# Patient Record
Sex: MALE | Race: WHITE | NOT HISPANIC OR LATINO
[De-identification: names, ages, dates, MRNs, and addresses within clinical notes are randomized per-mention and may not be internally consistent; named-entity substitution may affect disease eponyms.]

---

## 2021-06-09 PROBLEM — Z00.00 ENCOUNTER FOR PREVENTIVE HEALTH EXAMINATION: Status: ACTIVE | Noted: 2021-06-09

## 2021-06-15 ENCOUNTER — NON-APPOINTMENT (OUTPATIENT)
Age: 76
End: 2021-06-15

## 2021-06-15 ENCOUNTER — APPOINTMENT (OUTPATIENT)
Dept: CARDIOTHORACIC SURGERY | Facility: CLINIC | Age: 76
End: 2021-06-15
Payer: MEDICARE

## 2021-06-15 PROCEDURE — 99205 OFFICE O/P NEW HI 60 MIN: CPT

## 2021-06-16 ENCOUNTER — APPOINTMENT (OUTPATIENT)
Dept: CARDIOTHORACIC SURGERY | Facility: CLINIC | Age: 76
End: 2021-06-16
Payer: MEDICARE

## 2021-06-16 DIAGNOSIS — Z87.891 PERSONAL HISTORY OF NICOTINE DEPENDENCE: ICD-10-CM

## 2021-06-16 DIAGNOSIS — Z87.19 PERSONAL HISTORY OF OTHER DISEASES OF THE DIGESTIVE SYSTEM: ICD-10-CM

## 2021-06-16 DIAGNOSIS — I34.0 NONRHEUMATIC MITRAL (VALVE) INSUFFICIENCY: ICD-10-CM

## 2021-06-16 DIAGNOSIS — Z86.79 PERSONAL HISTORY OF OTHER DISEASES OF THE CIRCULATORY SYSTEM: ICD-10-CM

## 2021-06-16 PROCEDURE — 99215 OFFICE O/P EST HI 40 MIN: CPT | Mod: 95

## 2021-06-16 NOTE — DATA REVIEWED
[FreeTextEntry1] : 6/1/21 YURY: \par 1. LV size and function normal, EF >55%\par 2. normal RV size and function\par 3. no evidence of left atrial or left atrial appendage thrombus or mass\par 4. severe prolapse of the posterior mitral valve leaflet (P2 scallop primarily). Severe MR.\par 5. regurgitant fraction is calculated @ 63%. MR jet is eccentric and directed anterioly\par 6. ascending aorta is normal in size. Mild laminar atherosclerosis in visualized aorta.\par \par 5/20/21 Cardiac cath @ [facility]: normal coronary arteries\par \par

## 2021-06-16 NOTE — ASSESSMENT
[FreeTextEntry1] : 75 year old male with history of HLD, Parkinsons disease and mitral valve prolapse presents with severe MR with worsening SOB/YO. NYHA III.  Dr. Ty reviewed the cardiac cath images and echocardiogram images with the patient and his wife. Dr. Ty discussed the risks, benefits and alternatives to surgery and the various surgical approaches, minimally invasive versus sternotomy.  Risks include but not limited to death, heart attack, bleeding, stroke, kidney problems and infection.  Dr. Ty quoted a 1-2% operative mortality and complication risk. Dr. Ty feels the patient will benefit from and is a candidate for minimally invasive, mitral valve repair.  All questions were addressed and the patient agrees to proceed with surgery. \par \par Plan: \par arrange PST labs, EKG, pa/lat chest xray and carotid US @ [facility] office or [facility]\par SD 7/7/21\par \par \par

## 2021-06-16 NOTE — HISTORY OF PRESENT ILLNESS
[FreeTextEntry1] : 76 yo male with PMH of HLD, Parkinson's disease, BPH s/t laser reduction surgery, osteoarthritis s/p right TKR, ulcerative colitis, SBO s/p laparotomies,  small bowel resection presents and mitral valve prolapse with  severe mitral regurgitation. Patient followed by cardiologist, Dr. Warren Lee with serial echocardiograms. He reports worsening SOB/YO and decreased exercise tolerance over past few months. He denies c/o chest pain, sob at rest, fever, lower extremity, syncope or palpitations. 6/1/21 YURY revealed severe prolapse of mitral valve with severe regurgitation. 5/20/21 Cardiac cath at [facility] revealed normal coronary arteries. \par \par Patient seen today via [facility] for surgical consult with Dr. Ty. \par \par Pfizer vaccine, 2nd shot 2/13/21

## 2021-06-16 NOTE — CONSULT LETTER
[Dear  ___] : Dear  [unfilled], [Please see my note below.] : Please see my note below. [Sincerely,] : Sincerely, [FreeTextEntry2] : Warren Lee M.D. [FreeTextEntry1] : Please find attached our consultation on your patient, MEETA HAMM \par We take a multidisciplinary team approach to patient care and consider you, the referring physician, an extension of our team. We will maintain an open line of communication with you throughout your patient's treatment course.  \par It is our commitment to provide your patient with the highest quality of advanced therapeutic options. We thank you for allowing us to participate in the care of your patient.\par Please do not hesitate to contact our team with any questions or concerns at [phone].\par  [FreeTextEntry3] : Ant Ty MD, FRCS\par \par [facility] \par Department of Cardiothoracic  Surgery \par Director of Robotic Cardiac Surgery\par Professor of Cardiovascular & Thoracic Surgery\par [employer] \par \par [facility]\par

## 2021-06-16 NOTE — REASON FOR VISIT
[Follow-Up: _____] : a [unfilled] follow-up visit [Home] : at home, [unfilled] , at the time of the visit. [Medical Office: ([facility])___] : at the medical office located in  [Verbal consent obtained from patient] : the patient, [unfilled] [Spouse] : spouse

## 2021-06-16 NOTE — END OF VISIT
[Time Spent: ___ minutes] : I have spent [unfilled] minutes of time on the encounter. [FreeTextEntry3] : I, STEPHANIE SPRAGUE , am scribing for and in the presence of ROYAL HANSON the following sections: History of present illness, past Medical/family/surgical/family/social history, review of systems, vital signs, physical exam and disposition.\par I personally performed the services described in the documentation, reviewed the documentation recorded by the scribe in my presence and it accurately and completely records my words and actions.\par

## 2021-06-17 ENCOUNTER — NON-APPOINTMENT (OUTPATIENT)
Age: 76
End: 2021-06-17

## 2021-06-30 ENCOUNTER — LABORATORY RESULT (OUTPATIENT)
Age: 76
End: 2021-06-30

## 2021-07-01 ENCOUNTER — NON-APPOINTMENT (OUTPATIENT)
Age: 76
End: 2021-07-01

## 2021-07-01 NOTE — H&P ADULT - ASSESSMENT
75 year old male with history of HLD, Parkinsons disease and mitral valve prolapse presents with severe MR with worsening SOB/YO. NYHA III. Dr. Ty reviewed the cardiac cath images and echocardiogram images with the patient and his wife. Dr. Ty discussed the risks, benefits and alternatives to surgery and the various surgical approaches, minimally invasive versus sternotomy. Risks include but not limited to death, heart attack, bleeding, stroke, kidney problems and infection. Dr. Ty quoted a 1-2% operative mortality and complication risk. Dr. Ty feels the patient will benefit from and is a candidate for minimally invasive, mitral valve repair. All questions were addressed and the patient agrees to proceed with surgery.     Plan:   PST  SDA 7/7  Pt instructed to take sinemet the morning of surgery  Instructions provided re antibacterial showers and pt given 3 sponges  Send 2 type + screens in SDA unit  ***Pt instructed by his urologist to bring alfuzosin and Avodart to hospital(will be distributed by pharmacy)   75 year old male with history of HLD, Parkinsons disease and mitral valve prolapse presents with severe MR with worsening SOB/YO. NYHA III. Dr. Ty reviewed the cardiac cath images and echocardiogram images with the patient and his wife. Dr. Ty discussed the risks, benefits and alternatives to surgery and the various surgical approaches, minimally invasive versus sternotomy. Risks include but not limited to death, heart attack, bleeding, stroke, kidney problems and infection. Dr. Ty quoted a 1-2% operative mortality and complication risk. Dr. Ty feels the patient will benefit from and is a candidate for minimally invasive, mitral valve repair. All questions were addressed and the patient agrees to proceed with surgery.     Plan:   PST  SDA 7/7  Pt instructed to take sinemet the morning of surgery  Instructions provided re antibacterial showers and pt given 3 sponges  Send 2 type + screens in SDA unit  ***Pt instructed by his urologist to bring alfuzosin and Avodart to hospital(will be distributed by pharmacy)      Addendum:     Patient seen and examines in the holding area. No changes since telehealth visit. Currently feeling well, no chest pain or SOB.

## 2021-07-01 NOTE — H&P ADULT - HISTORY OF PRESENT ILLNESS
74 yo male with PMH of HLD, Parkinson's disease, BPH s/p prostatectomy,  osteoarthritis s/p right TKR, ulcerative colitis, SBO s/p laparotomies, small bowel resection presents and mitral valve prolapse with severe mitral regurgitation. Patient followed by cardiologist, Dr. Warren Lee with serial echocardiograms. He reports worsening SOB/YO and decreased exercise tolerance over past few months. He denies c/o chest pain, sob at rest, fever, lower extremity, syncope or palpitations. 6/1/21 YURY revealed severe prolapse of mitral valve with severe regurgitation. 5/20/21 Cardiac cath at [facility] revealed normal coronary arteries. NYHA III.    Patient seen today via [facility] for surgical consult with Dr. Ty. He presents today for elective surgery.    Pfizer vaccine, 2nd shot 2/13/21

## 2021-07-01 NOTE — H&P ADULT - NSHPPHYSICALEXAM_GEN_ALL_CORE
Constitutional: NAD    Eyes: EOMI    ENMT: PERRL    Neck: supple, no JVD    Respiratory: CTABL    Cardiovascular: RRR, no m/r/g    Gastrointestinal: + BS, soft, non tender    Extremities: warm, no edema    Vascular: Carotids, radials, femorals, DP, PT 2 + bilaterally    Neurological: A&O, non focal    Skin: no lesions or rashes    Musculoskeletal: normal gait, strength 5/5 and equal in bilateral upper and lower extremities

## 2021-07-01 NOTE — H&P ADULT - NSICDXPASTSURGICALHX_GEN_ALL_CORE_FT
PAST SURGICAL HISTORY:  H/O inguinal hernia repair     S/P small bowel resection     S/P total knee replacement, right

## 2021-07-01 NOTE — H&P ADULT - NSHPLABSRESULTS_GEN_ALL_CORE
Hgb A1C = 5.0  creat = 1.06  hct= 37.3  hgb= 11.9  plt= 252  WBC= 7.25  INR= 1.00  tot alb= 4.3  tot bili= 0.5    TSH= 2.24    6/30/21 Chest xray: clear lungs    3/23/21 EKG: SR w/frequent PVCs, 78bpm    6/1/21 YURY:   1. LV size and function normal, EF >55%  2. normal RV size and function  3. no evidence of left atrial or left atrial appendage thrombus or mass  4. severe prolapse of the posterior mitral valve leaflet (P2 scallop primarily). Severe MR.  5. regurgitant fraction is calculated @ 63%. MR jet is eccentric and directed anterioly  6. ascending aorta is normal in size. Mild laminar atherosclerosis in visualized aorta.    5/20/21 Cardiac cath @ [facility]: normal coronary arteries

## 2021-07-01 NOTE — H&P ADULT - NSHPREVIEWOFSYSTEMS_GEN_ALL_CORE
CONSTITUTIONAL: No fevers / chills, sweats, fatigue  NEUROLOGICAL: No parathesias, seizures, syncope, confusion  EYES: No blurry vision, discharge, pain, loss of vision  ENMT: No difficulty hearing, vertigo, dysphagia, epistaxis  CV: per HPI  RESPIRATORY:  No wheezing, SOB, cough / sputum, hemoptysis  GI: No nausea, vomiting, diarrhea, constipation, melena  : No hematuria, dysuria, urgency, incontinence  MUSCULOSKELETAL: No arthritis, joint swelling, muscle weakness  SKIN/BREAST: No rash, itching, hair loss, masses  PSYCHIATRY: No depression, anxiety

## 2021-07-01 NOTE — H&P ADULT - NSICDXFAMILYHX_GEN_ALL_CORE_FT
FAMILY HISTORY:  Father  Still living? Unknown  Known health problems: none, Age at diagnosis: Age Unknown    Mother  Still living? Unknown  Known health problems: none, Age at diagnosis: Age Unknown

## 2021-07-01 NOTE — H&P ADULT - NSICDXPASTMEDICALHX_GEN_ALL_CORE_FT
PAST MEDICAL HISTORY:  BPH associated with nocturia     GERD (gastroesophageal reflux disease)     History of mitral valve prolapse     Parkinson disease     SBO (small bowel obstruction)     Ulcerative colitis

## 2021-07-06 ENCOUNTER — TRANSCRIPTION ENCOUNTER (OUTPATIENT)
Age: 76
End: 2021-07-06

## 2021-07-06 VITALS
SYSTOLIC BLOOD PRESSURE: 137 MMHG | RESPIRATION RATE: 17 BRPM | DIASTOLIC BLOOD PRESSURE: 64 MMHG | OXYGEN SATURATION: 95 % | TEMPERATURE: 98 F | HEIGHT: 66 IN | WEIGHT: 141.98 LBS | HEART RATE: 74 BPM

## 2021-07-06 NOTE — PRE-OP CHECKLIST - SELECT TESTS ORDERED
TSH, HbA1C 5.0, YURY/CMP/PT/PTT/INR/EKG/CXR TSH, HbA1C 5.0, YURY/CBC/CMP/PT/PTT/INR/EKG/CXR TSH, HbA1C 5.0, YURY/CBC/CMP/PT/PTT/INR/Type and Screen/EKG/CXR

## 2021-07-07 ENCOUNTER — INPATIENT (INPATIENT)
Facility: HOSPITAL | Age: 76
LOS: 3 days | Discharge: HOME | DRG: 219 | End: 2021-07-11
Attending: THORACIC SURGERY (CARDIOTHORACIC VASCULAR SURGERY) | Admitting: THORACIC SURGERY (CARDIOTHORACIC VASCULAR SURGERY)
Payer: MEDICARE

## 2021-07-07 ENCOUNTER — APPOINTMENT (OUTPATIENT)
Dept: CARDIOTHORACIC SURGERY | Facility: HOSPITAL | Age: 76
End: 2021-07-07

## 2021-07-07 ENCOUNTER — RESULT REVIEW (OUTPATIENT)
Age: 76
End: 2021-07-07

## 2021-07-07 DIAGNOSIS — Z98.890 OTHER SPECIFIED POSTPROCEDURAL STATES: ICD-10-CM

## 2021-07-07 DIAGNOSIS — Z09 ENCOUNTER FOR FOLLOW-UP EXAMINATION AFTER COMPLETED TREATMENT FOR CONDITIONS OTHER THAN MALIGNANT NEOPLASM: ICD-10-CM

## 2021-07-07 DIAGNOSIS — Z96.651 PRESENCE OF RIGHT ARTIFICIAL KNEE JOINT: Chronic | ICD-10-CM

## 2021-07-07 DIAGNOSIS — Z90.49 ACQUIRED ABSENCE OF OTHER SPECIFIED PARTS OF DIGESTIVE TRACT: Chronic | ICD-10-CM

## 2021-07-07 DIAGNOSIS — Z98.890 OTHER SPECIFIED POSTPROCEDURAL STATES: Chronic | ICD-10-CM

## 2021-07-07 LAB
ALBUMIN SERPL ELPH-MCNC: 3.6 G/DL — SIGNIFICANT CHANGE UP (ref 3.3–5)
ALP SERPL-CCNC: 45 U/L — SIGNIFICANT CHANGE UP (ref 40–120)
ALT FLD-CCNC: 6 U/L — LOW (ref 10–45)
ANION GAP SERPL CALC-SCNC: 9 MMOL/L — SIGNIFICANT CHANGE UP (ref 5–17)
ANION GAP SERPL CALC-SCNC: 9 MMOL/L — SIGNIFICANT CHANGE UP (ref 5–17)
ANISOCYTOSIS BLD QL: SLIGHT — SIGNIFICANT CHANGE UP
APTT BLD: 31.8 SEC — SIGNIFICANT CHANGE UP (ref 27.5–35.5)
APTT BLD: 41.8 SEC — HIGH (ref 27.5–35.5)
AST SERPL-CCNC: 46 U/L — HIGH (ref 10–40)
BASOPHILS # BLD AUTO: 0 K/UL — SIGNIFICANT CHANGE UP (ref 0–0.2)
BASOPHILS NFR BLD AUTO: 0 % — SIGNIFICANT CHANGE UP (ref 0–2)
BILIRUB SERPL-MCNC: 1.1 MG/DL — SIGNIFICANT CHANGE UP (ref 0.2–1.2)
BLD GP AB SCN SERPL QL: NEGATIVE — SIGNIFICANT CHANGE UP
BLD GP AB SCN SERPL QL: NEGATIVE — SIGNIFICANT CHANGE UP
BUN SERPL-MCNC: 17 MG/DL — SIGNIFICANT CHANGE UP (ref 7–23)
BUN SERPL-MCNC: 18 MG/DL — SIGNIFICANT CHANGE UP (ref 7–23)
BURR CELLS BLD QL SMEAR: PRESENT — SIGNIFICANT CHANGE UP
CALCIUM SERPL-MCNC: 9.4 MG/DL — SIGNIFICANT CHANGE UP (ref 8.4–10.5)
CALCIUM SERPL-MCNC: 9.9 MG/DL — SIGNIFICANT CHANGE UP (ref 8.4–10.5)
CHLORIDE SERPL-SCNC: 110 MMOL/L — HIGH (ref 96–108)
CHLORIDE SERPL-SCNC: 110 MMOL/L — HIGH (ref 96–108)
CO2 SERPL-SCNC: 23 MMOL/L — SIGNIFICANT CHANGE UP (ref 22–31)
CO2 SERPL-SCNC: 23 MMOL/L — SIGNIFICANT CHANGE UP (ref 22–31)
CREAT SERPL-MCNC: 0.87 MG/DL — SIGNIFICANT CHANGE UP (ref 0.5–1.3)
CREAT SERPL-MCNC: 1.01 MG/DL — SIGNIFICANT CHANGE UP (ref 0.5–1.3)
ELLIPTOCYTES BLD QL SMEAR: SLIGHT — SIGNIFICANT CHANGE UP
EOSINOPHIL # BLD AUTO: 0 K/UL — SIGNIFICANT CHANGE UP (ref 0–0.5)
EOSINOPHIL NFR BLD AUTO: 0 % — SIGNIFICANT CHANGE UP (ref 0–6)
GAS PNL BLDA: SIGNIFICANT CHANGE UP
GLUCOSE BLDC GLUCOMTR-MCNC: 130 MG/DL — HIGH (ref 70–99)
GLUCOSE BLDC GLUCOMTR-MCNC: 171 MG/DL — HIGH (ref 70–99)
GLUCOSE BLDC GLUCOMTR-MCNC: 199 MG/DL — HIGH (ref 70–99)
GLUCOSE BLDC GLUCOMTR-MCNC: 204 MG/DL — HIGH (ref 70–99)
GLUCOSE SERPL-MCNC: 141 MG/DL — HIGH (ref 70–99)
GLUCOSE SERPL-MCNC: 174 MG/DL — HIGH (ref 70–99)
HCT VFR BLD CALC: 26.3 % — LOW (ref 39–50)
HCT VFR BLD CALC: 30 % — LOW (ref 39–50)
HGB BLD-MCNC: 10 G/DL — LOW (ref 13–17)
HGB BLD-MCNC: 8.4 G/DL — LOW (ref 13–17)
HYPOCHROMIA BLD QL: SLIGHT — SIGNIFICANT CHANGE UP
INR BLD: 1.2 — HIGH (ref 0.88–1.16)
INR BLD: 1.22 — HIGH (ref 0.88–1.16)
LYMPHOCYTES # BLD AUTO: 0.47 K/UL — LOW (ref 1–3.3)
LYMPHOCYTES # BLD AUTO: 2.6 % — LOW (ref 13–44)
MACROCYTES BLD QL: SLIGHT — SIGNIFICANT CHANGE UP
MAGNESIUM SERPL-MCNC: 2.3 MG/DL — SIGNIFICANT CHANGE UP (ref 1.6–2.6)
MANUAL SMEAR VERIFICATION: SIGNIFICANT CHANGE UP
MCHC RBC-ENTMCNC: 31.5 PG — SIGNIFICANT CHANGE UP (ref 27–34)
MCHC RBC-ENTMCNC: 31.9 GM/DL — LOW (ref 32–36)
MCHC RBC-ENTMCNC: 32.7 PG — SIGNIFICANT CHANGE UP (ref 27–34)
MCHC RBC-ENTMCNC: 33.3 GM/DL — SIGNIFICANT CHANGE UP (ref 32–36)
MCV RBC AUTO: 98 FL — SIGNIFICANT CHANGE UP (ref 80–100)
MCV RBC AUTO: 98.5 FL — SIGNIFICANT CHANGE UP (ref 80–100)
MONOCYTES # BLD AUTO: 0.79 K/UL — SIGNIFICANT CHANGE UP (ref 0–0.9)
MONOCYTES NFR BLD AUTO: 4.4 % — SIGNIFICANT CHANGE UP (ref 2–14)
NEUTROPHILS # BLD AUTO: 16.67 K/UL — HIGH (ref 1.8–7.4)
NEUTROPHILS NFR BLD AUTO: 93 % — HIGH (ref 43–77)
NRBC # BLD: 0 /100 WBCS — SIGNIFICANT CHANGE UP (ref 0–0)
OVALOCYTES BLD QL SMEAR: SLIGHT — SIGNIFICANT CHANGE UP
PHOSPHATE SERPL-MCNC: 1.3 MG/DL — LOW (ref 2.5–4.5)
PLAT MORPH BLD: ABNORMAL
PLATELET # BLD AUTO: 159 K/UL — SIGNIFICANT CHANGE UP (ref 150–400)
PLATELET # BLD AUTO: 184 K/UL — SIGNIFICANT CHANGE UP (ref 150–400)
POIKILOCYTOSIS BLD QL AUTO: SIGNIFICANT CHANGE UP
POTASSIUM SERPL-MCNC: 3.7 MMOL/L — SIGNIFICANT CHANGE UP (ref 3.5–5.3)
POTASSIUM SERPL-MCNC: 4.5 MMOL/L — SIGNIFICANT CHANGE UP (ref 3.5–5.3)
POTASSIUM SERPL-SCNC: 3.7 MMOL/L — SIGNIFICANT CHANGE UP (ref 3.5–5.3)
POTASSIUM SERPL-SCNC: 4.5 MMOL/L — SIGNIFICANT CHANGE UP (ref 3.5–5.3)
PROT SERPL-MCNC: 5.5 G/DL — LOW (ref 6–8.3)
PROTHROM AB SERPL-ACNC: 14.3 SEC — HIGH (ref 10.6–13.6)
PROTHROM AB SERPL-ACNC: 14.5 SEC — HIGH (ref 10.6–13.6)
RBC # BLD: 2.67 M/UL — LOW (ref 4.2–5.8)
RBC # BLD: 3.06 M/UL — LOW (ref 4.2–5.8)
RBC # FLD: 13.2 % — SIGNIFICANT CHANGE UP (ref 10.3–14.5)
RBC # FLD: 13.5 % — SIGNIFICANT CHANGE UP (ref 10.3–14.5)
RBC BLD AUTO: ABNORMAL
RH IG SCN BLD-IMP: POSITIVE — SIGNIFICANT CHANGE UP
RH IG SCN BLD-IMP: POSITIVE — SIGNIFICANT CHANGE UP
SCHISTOCYTES BLD QL AUTO: SIGNIFICANT CHANGE UP
SODIUM SERPL-SCNC: 142 MMOL/L — SIGNIFICANT CHANGE UP (ref 135–145)
SODIUM SERPL-SCNC: 142 MMOL/L — SIGNIFICANT CHANGE UP (ref 135–145)
WBC # BLD: 13.39 K/UL — HIGH (ref 3.8–10.5)
WBC # BLD: 17.93 K/UL — HIGH (ref 3.8–10.5)
WBC # FLD AUTO: 13.39 K/UL — HIGH (ref 3.8–10.5)
WBC # FLD AUTO: 17.93 K/UL — HIGH (ref 3.8–10.5)

## 2021-07-07 PROCEDURE — 93010 ELECTROCARDIOGRAM REPORT: CPT

## 2021-07-07 PROCEDURE — 99291 CRITICAL CARE FIRST HOUR: CPT

## 2021-07-07 PROCEDURE — 99292 CRITICAL CARE ADDL 30 MIN: CPT

## 2021-07-07 PROCEDURE — 76937 US GUIDE VASCULAR ACCESS: CPT | Mod: 26

## 2021-07-07 PROCEDURE — 71045 X-RAY EXAM CHEST 1 VIEW: CPT | Mod: 26

## 2021-07-07 PROCEDURE — 88305 TISSUE EXAM BY PATHOLOGIST: CPT | Mod: 26

## 2021-07-07 PROCEDURE — 33426 REPAIR OF MITRAL VALVE: CPT | Mod: AS

## 2021-07-07 PROCEDURE — 33426 REPAIR OF MITRAL VALVE: CPT

## 2021-07-07 RX ORDER — ALBUMIN HUMAN 25 %
250 VIAL (ML) INTRAVENOUS ONCE
Refills: 0 | Status: COMPLETED | OUTPATIENT
Start: 2021-07-07 | End: 2021-07-07

## 2021-07-07 RX ORDER — DEXMEDETOMIDINE HYDROCHLORIDE IN 0.9% SODIUM CHLORIDE 4 UG/ML
0.5 INJECTION INTRAVENOUS
Qty: 400 | Refills: 0 | Status: DISCONTINUED | OUTPATIENT
Start: 2021-07-07 | End: 2021-07-08

## 2021-07-07 RX ORDER — MULTIVIT-MIN/FERROUS GLUCONATE 9 MG/15 ML
1 LIQUID (ML) ORAL
Qty: 0 | Refills: 0 | DISCHARGE

## 2021-07-07 RX ORDER — NICARDIPINE HYDROCHLORIDE 30 MG/1
5 CAPSULE, EXTENDED RELEASE ORAL
Qty: 40 | Refills: 0 | Status: DISCONTINUED | OUTPATIENT
Start: 2021-07-07 | End: 2021-07-08

## 2021-07-07 RX ORDER — CARBIDOPA AND LEVODOPA 25; 100 MG/1; MG/1
1 TABLET ORAL ONCE
Refills: 0 | Status: COMPLETED | OUTPATIENT
Start: 2021-07-07 | End: 2021-07-07

## 2021-07-07 RX ORDER — PANTOPRAZOLE SODIUM 20 MG/1
40 TABLET, DELAYED RELEASE ORAL DAILY
Refills: 0 | Status: DISCONTINUED | OUTPATIENT
Start: 2021-07-07 | End: 2021-07-08

## 2021-07-07 RX ORDER — INSULIN HUMAN 100 [IU]/ML
2 INJECTION, SOLUTION SUBCUTANEOUS
Qty: 50 | Refills: 0 | Status: DISCONTINUED | OUTPATIENT
Start: 2021-07-07 | End: 2021-07-08

## 2021-07-07 RX ORDER — CHLORHEXIDINE GLUCONATE 213 G/1000ML
1 SOLUTION TOPICAL
Refills: 0 | Status: DISCONTINUED | OUTPATIENT
Start: 2021-07-07 | End: 2021-07-09

## 2021-07-07 RX ORDER — ESCITALOPRAM OXALATE 10 MG/1
1 TABLET, FILM COATED ORAL
Qty: 0 | Refills: 0 | DISCHARGE

## 2021-07-07 RX ORDER — DEXTROSE 50 % IN WATER 50 %
50 SYRINGE (ML) INTRAVENOUS
Refills: 0 | Status: DISCONTINUED | OUTPATIENT
Start: 2021-07-07 | End: 2021-07-08

## 2021-07-07 RX ORDER — DEXTROSE 50 % IN WATER 50 %
25 SYRINGE (ML) INTRAVENOUS
Refills: 0 | Status: DISCONTINUED | OUTPATIENT
Start: 2021-07-07 | End: 2021-07-08

## 2021-07-07 RX ORDER — SODIUM CHLORIDE 9 MG/ML
500 INJECTION, SOLUTION INTRAVENOUS ONCE
Refills: 0 | Status: COMPLETED | OUTPATIENT
Start: 2021-07-07 | End: 2021-07-07

## 2021-07-07 RX ORDER — ALBUMIN HUMAN 25 %
250 VIAL (ML) INTRAVENOUS
Refills: 0 | Status: COMPLETED | OUTPATIENT
Start: 2021-07-07 | End: 2021-07-07

## 2021-07-07 RX ORDER — MEPERIDINE HYDROCHLORIDE 50 MG/ML
25 INJECTION INTRAMUSCULAR; INTRAVENOUS; SUBCUTANEOUS ONCE
Refills: 0 | Status: DISCONTINUED | OUTPATIENT
Start: 2021-07-07 | End: 2021-07-07

## 2021-07-07 RX ORDER — HYDROMORPHONE HYDROCHLORIDE 2 MG/ML
0.5 INJECTION INTRAMUSCULAR; INTRAVENOUS; SUBCUTANEOUS ONCE
Refills: 0 | Status: DISCONTINUED | OUTPATIENT
Start: 2021-07-07 | End: 2021-07-08

## 2021-07-07 RX ORDER — CHLORHEXIDINE GLUCONATE 213 G/1000ML
15 SOLUTION TOPICAL EVERY 12 HOURS
Refills: 0 | Status: DISCONTINUED | OUTPATIENT
Start: 2021-07-07 | End: 2021-07-08

## 2021-07-07 RX ORDER — POTASSIUM PHOSPHATE, MONOBASIC POTASSIUM PHOSPHATE, DIBASIC 236; 224 MG/ML; MG/ML
30 INJECTION, SOLUTION INTRAVENOUS ONCE
Refills: 0 | Status: COMPLETED | OUTPATIENT
Start: 2021-07-07 | End: 2021-07-07

## 2021-07-07 RX ORDER — CARBIDOPA AND LEVODOPA 25; 100 MG/1; MG/1
1 TABLET ORAL
Qty: 0 | Refills: 0 | DISCHARGE

## 2021-07-07 RX ORDER — LANOLIN ALCOHOL/MO/W.PET/CERES
1 CREAM (GRAM) TOPICAL
Qty: 0 | Refills: 0 | DISCHARGE

## 2021-07-07 RX ORDER — NOREPINEPHRINE BITARTRATE/D5W 8 MG/250ML
0.05 PLASTIC BAG, INJECTION (ML) INTRAVENOUS
Qty: 8 | Refills: 0 | Status: DISCONTINUED | OUTPATIENT
Start: 2021-07-07 | End: 2021-07-09

## 2021-07-07 RX ORDER — DUTASTERIDE 0.5 MG/1
1 CAPSULE, LIQUID FILLED ORAL
Qty: 0 | Refills: 0 | DISCHARGE

## 2021-07-07 RX ORDER — ATORVASTATIN CALCIUM 80 MG/1
1 TABLET, FILM COATED ORAL
Qty: 0 | Refills: 0 | DISCHARGE

## 2021-07-07 RX ORDER — PREGABALIN 225 MG/1
1 CAPSULE ORAL
Qty: 0 | Refills: 0 | DISCHARGE

## 2021-07-07 RX ORDER — ACETAMINOPHEN 500 MG
1000 TABLET ORAL ONCE
Refills: 0 | Status: COMPLETED | OUTPATIENT
Start: 2021-07-07 | End: 2021-07-07

## 2021-07-07 RX ORDER — ALFUZOSIN HYDROCHLORIDE 10 MG/1
1 TABLET, EXTENDED RELEASE ORAL
Qty: 0 | Refills: 0 | DISCHARGE

## 2021-07-07 RX ORDER — ASPIRIN/CALCIUM CARB/MAGNESIUM 324 MG
81 TABLET ORAL DAILY
Refills: 0 | Status: DISCONTINUED | OUTPATIENT
Start: 2021-07-07 | End: 2021-07-08

## 2021-07-07 RX ORDER — HYOSCYAMINE SULFATE 0.13 MG
1 TABLET ORAL
Qty: 0 | Refills: 0 | DISCHARGE

## 2021-07-07 RX ORDER — ZOLPIDEM TARTRATE 10 MG/1
1 TABLET ORAL
Qty: 0 | Refills: 0 | DISCHARGE

## 2021-07-07 RX ORDER — HEPARIN SODIUM 5000 [USP'U]/ML
5000 INJECTION INTRAVENOUS; SUBCUTANEOUS EVERY 8 HOURS
Refills: 0 | Status: DISCONTINUED | OUTPATIENT
Start: 2021-07-07 | End: 2021-07-11

## 2021-07-07 RX ORDER — CARBIDOPA AND LEVODOPA 25; 100 MG/1; MG/1
1 TABLET ORAL THREE TIMES A DAY
Refills: 0 | Status: DISCONTINUED | OUTPATIENT
Start: 2021-07-08 | End: 2021-07-08

## 2021-07-07 RX ORDER — BUPIVACAINE 13.3 MG/ML
20 INJECTION, SUSPENSION, LIPOSOMAL INFILTRATION ONCE
Refills: 0 | Status: DISCONTINUED | OUTPATIENT
Start: 2021-07-07 | End: 2021-07-08

## 2021-07-07 RX ORDER — VANCOMYCIN HCL 1 G
1000 VIAL (EA) INTRAVENOUS ONCE
Refills: 0 | Status: COMPLETED | OUTPATIENT
Start: 2021-07-07 | End: 2021-07-07

## 2021-07-07 RX ORDER — FENTANYL CITRATE 50 UG/ML
25 INJECTION INTRAVENOUS ONCE
Refills: 0 | Status: DISCONTINUED | OUTPATIENT
Start: 2021-07-07 | End: 2021-07-07

## 2021-07-07 RX ORDER — GABAPENTIN 400 MG/1
1 CAPSULE ORAL
Qty: 0 | Refills: 0 | DISCHARGE

## 2021-07-07 RX ORDER — MILRINONE LACTATE 1 MG/ML
0.12 INJECTION, SOLUTION INTRAVENOUS
Qty: 20 | Refills: 0 | Status: DISCONTINUED | OUTPATIENT
Start: 2021-07-07 | End: 2021-07-09

## 2021-07-07 RX ORDER — SODIUM CHLORIDE 9 MG/ML
1000 INJECTION INTRAMUSCULAR; INTRAVENOUS; SUBCUTANEOUS
Refills: 0 | Status: DISCONTINUED | OUTPATIENT
Start: 2021-07-07 | End: 2021-07-09

## 2021-07-07 RX ORDER — FAMOTIDINE 10 MG/ML
1 INJECTION INTRAVENOUS
Qty: 0 | Refills: 0 | DISCHARGE

## 2021-07-07 RX ADMIN — Medication 125 MILLILITER(S): at 19:43

## 2021-07-07 RX ADMIN — SODIUM CHLORIDE 10 MILLILITER(S): 9 INJECTION INTRAMUSCULAR; INTRAVENOUS; SUBCUTANEOUS at 16:15

## 2021-07-07 RX ADMIN — MILRINONE LACTATE 4.83 MICROGRAM(S)/KG/MIN: 1 INJECTION, SOLUTION INTRAVENOUS at 19:46

## 2021-07-07 RX ADMIN — Medication 125 MILLILITER(S): at 22:21

## 2021-07-07 RX ADMIN — Medication 250 MILLIGRAM(S): at 23:14

## 2021-07-07 RX ADMIN — NICARDIPINE HYDROCHLORIDE 25 MG/HR: 30 CAPSULE, EXTENDED RELEASE ORAL at 19:48

## 2021-07-07 RX ADMIN — DEXMEDETOMIDINE HYDROCHLORIDE IN 0.9% SODIUM CHLORIDE 8.05 MICROGRAM(S)/KG/HR: 4 INJECTION INTRAVENOUS at 19:46

## 2021-07-07 RX ADMIN — CHLORHEXIDINE GLUCONATE 15 MILLILITER(S): 213 SOLUTION TOPICAL at 19:42

## 2021-07-07 RX ADMIN — Medication 125 MILLILITER(S): at 19:44

## 2021-07-07 RX ADMIN — Medication 6.04 MICROGRAM(S)/KG/MIN: at 19:47

## 2021-07-07 RX ADMIN — SODIUM CHLORIDE 500 MILLILITER(S): 9 INJECTION, SOLUTION INTRAVENOUS at 19:48

## 2021-07-07 RX ADMIN — POTASSIUM PHOSPHATE, MONOBASIC POTASSIUM PHOSPHATE, DIBASIC 83.33 MILLIMOLE(S): 236; 224 INJECTION, SOLUTION INTRAVENOUS at 23:43

## 2021-07-07 RX ADMIN — FENTANYL CITRATE 25 MICROGRAM(S): 50 INJECTION INTRAVENOUS at 23:15

## 2021-07-07 RX ADMIN — Medication 400 MILLIGRAM(S): at 20:35

## 2021-07-07 RX ADMIN — FENTANYL CITRATE 25 MICROGRAM(S): 50 INJECTION INTRAVENOUS at 22:52

## 2021-07-07 RX ADMIN — Medication 1000 MILLIGRAM(S): at 20:45

## 2021-07-07 RX ADMIN — Medication 125 MILLILITER(S): at 22:36

## 2021-07-07 RX ADMIN — CARBIDOPA AND LEVODOPA 1 TABLET(S): 25; 100 TABLET ORAL at 21:30

## 2021-07-07 RX ADMIN — INSULIN HUMAN 2 UNIT(S)/HR: 100 INJECTION, SOLUTION SUBCUTANEOUS at 18:59

## 2021-07-07 NOTE — BRIEF OPERATIVE NOTE - NSICDXBRIEFPROCEDURE_GEN_ALL_CORE_FT
PROCEDURES:  Minimally invasive repair or replacement of mitral valve with transesophageal echocardiography (YURY) 07-Jul-2021 11:10:20 Mitral valve repair Jodie Stephenson  
Hpi Title: Evaluation of a Skin Lesion
How Severe Are Your Spot(S)?: mild
Have Your Spot(S) Been Treated In The Past?: has not been treated

## 2021-07-07 NOTE — BRIEF OPERATIVE NOTE - COMMENTS
Arrived to PACU intubated, HD stable    I first assisted for the entirety of the case, including but not limited to opening, cannulation, valve repair/replacement, decannulation, and closure. Arrived to PACU intubated, HD stable no pressors    I first assisted for the entirety of the case, including but not limited to opening, cannulation, valve repair/replacement, decannulation, and closure.

## 2021-07-07 NOTE — PROGRESS NOTE ADULT - SUBJECTIVE AND OBJECTIVE BOX
CTICU  CRITICAL  CARE  attending     Hand off received 					   Pertinent clinical, laboratory, radiographic, hemodynamic, echocardiographic, respiratory data, microbiologic data and chart were reviewed and analyzed frequently throughout the course of the day and night  Patient seen and examined with CTS/ SH attending at bedside  Pt is a 75y , Male, HEALTH ISSUES - PROBLEM Dx:      , FAMILY HISTORY:  Known health problems: none (Father, Mother)    PAST MEDICAL & SURGICAL HISTORY:  GERD (gastroesophageal reflux disease)    History of mitral valve prolapse    Parkinson disease    Ulcerative colitis    BPH associated with nocturia    SBO (small bowel obstruction)    H/O inguinal hernia repair    S/P small bowel resection    S/P total knee replacement, right      Patient is a 75y old  Male who presents with a chief complaint of Mitral Valve Regurgitation (01 Jul 2021 13:39)      14 system review limited by mentation and multiorgan morbidity     Vital signs, hemodynamic and respiratory parameters were reviewed from the bedside nursing flowsheet.  ICU Vital Signs Last 24 Hrs  T(C): 36 (07 Jul 2021 16:20), Max: 36 (07 Jul 2021 16:20)  T(F): 96.8 (07 Jul 2021 16:20), Max: 96.8 (07 Jul 2021 16:20)  HR: 68 (07 Jul 2021 16:20) (68 - 68)  BP: --  BP(mean): --  ABP: 141/72 (07 Jul 2021 16:20) (141/72 - 141/72)  ABP(mean): 101 (07 Jul 2021 16:20) (101 - 101)  RR: 12 (07 Jul 2021 16:20) (12 - 12)  SpO2: 100% (07 Jul 2021 16:20) (100% - 100%)    Adult Advanced Hemodynamics Last 24 Hrs  CVP(mm Hg): --  CVP(cm H2O): --  CO: --  CI: --  PA: --  PA(mean): --  PCWP: --  SVR: --  SVRI: --  PVR: --  PVRI: --, ABG - ( 07 Jul 2021 16:23 )  pH, Arterial: 7.41  pH, Blood: x     /  pCO2: 34    /  pO2: 279   / HCO3: 22    / Base Excess: -2.4  /  SaO2: 100.0               Intake and output was reviewed and the fluid balance was calculated  Daily     Daily   I&O's Summary    07 Jul 2021 07:01  -  07 Jul 2021 16:31  --------------------------------------------------------  IN: 0 mL / OUT: 125 mL / NET: -125 mL        All lines and drain sites were assessed  Glycemic trend was reviewedCAPILLARY BLOOD GLUCOSE        No acute change in focality  Auscultation of the chest reveals equal bs  Abdomen is soft  Extremities are warm and well perfused  Wounds appear clean and unremarkable  Antibiotics are periop    labs            The current medications were reviewed   MEDICATIONS  (STANDING):  aspirin  chewable 81 milliGRAM(s) Oral daily  BUpivacaine liposome 1.3% Injectable (no eMAR) 20 milliLiter(s) Local Injection once  chlorhexidine 0.12% Liquid 15 milliLiter(s) Oral Mucosa every 12 hours  dextrose 50% Injectable 50 milliLiter(s) IV Push every 15 minutes  dextrose 50% Injectable 25 milliLiter(s) IV Push every 15 minutes  heparin   Injectable 5000 Unit(s) SubCutaneous every 8 hours  insulin regular Infusion 2 Unit(s)/Hr (2 mL/Hr) IV Continuous <Continuous>  pantoprazole  Injectable 40 milliGRAM(s) IV Push daily  sodium chloride 0.9%. 1000 milliLiter(s) (10 mL/Hr) IV Continuous <Continuous>  vancomycin  IVPB 1000 milliGRAM(s) IV Intermittent once    MEDICATIONS  (PRN):       PROBLEM LIST/ ASSESSMENT:  HEALTH ISSUES - PROBLEM Dx:      ,   Patient is a 75y old  Male who presents with a chief complaint of Mitral Valve Regurgitation (01 Jul 2021 13:39)     s/p cardiac surgery                My plan includes :  close hemodynamic, ventilatory and drain monitoring and management per post op routine    Monitor for arrhythmias and monitor parameters for organ perfusion  beta blockade not administered due to hemodynamic instability and bradycardia  monitor neurologic status  Head of the bed should remain elevated to 45 deg .   chest PT and IS will be encouraged  monitor adequacy of oxygenation and ventilation and attempt to wean oxygen  antibiotic regimen will be tailored to the clinical, laboratory and microbiologic data  Nutritional goals will be met using po eventually , ensure adequate caloric intake and montior the same  Stress ulcer and VTE prophylaxis will be achieved    Glycemic control is satisfactory  Electrolytes have been repleted as necessary and wound care has been carried out. Pain control has been achieved.   agressive physical therapy and early mobility and ambulation goals will be met   The family was updated about the course and plan  CRITICAL CARE TIME personally provided by me  in evaluation and management, reassessments, review and interpretation of labs and x-rays, ventilator and hemodynamic management, formulating a plan and coordinating care: ___90____ MIN.  Time does not include procedural time. Time spent was non routine post-operarive caRE and included multiple and repeated evaluations at the bedside  CTICU ATTENDING     					    Oscar Buckley MD

## 2021-07-07 NOTE — PROGRESS NOTE ADULT - SUBJECTIVE AND OBJECTIVE BOX
CTICU  CRITICAL  CARE  attending     Hand off received 					   Pertinent clinical, laboratory, radiographic, hemodynamic, echocardiographic, respiratory data, microbiologic data and chart were reviewed and analyzed frequently throughout the course of the day and night    76 yo male with HLD, Parkinson's disease, BPH s/p prostatectomy,  osteoarthritis s/p right TKR, ulcerative colitis, Small Bowel Obstruction with multiple laparotomies and small bowel resections.  He has long standing history of mitral valve prolapse with severe mitral regurgitation.   The patient has been followed by cardiologist, Dr. Warren Lee with serial echocardiograms.   He reports worsening SOB/YO and decreased exercise tolerance over past few months. He denies c/o chest pain, sob at rest, fever, lower extremity, syncope or palpitations.   YURY revealed severe prolapse of mitral valve with severe regurgitation.   Elective Cardiac cath at [facility]  revealed normal coronary arteries.    S/P Mitral valve repair.      FAMILY HISTORY:  Known health problems: none (Father, Mother)    PAST MEDICAL & SURGICAL HISTORY:  GERD (gastroesophageal reflux disease)  History of mitral valve prolapse  Parkinson disease  Ulcerative colitis  BPH associated with nocturia  SBO (small bowel obstruction)  H/O inguinal hernia repair  S/P small bowel resection  S/P total knee replacement, right          14 system review was unremarkable      Vital signs, hemodynamic and respiratory parameters were reviewed from the bedside nursing flow sheet.  ICU Vital Signs Last 24 Hrs  T(C): 36.4 (07 Jul 2021 22:32), Max: 36.4 (07 Jul 2021 22:32)  T(F): 97.5 (07 Jul 2021 22:32), Max: 97.5 (07 Jul 2021 22:32)  HR: 79 (07 Jul 2021 22:00) (66 - 95)  BP: 94/49 (07 Jul 2021 22:00) (94/49 - 133/58)  BP(mean): 70 (07 Jul 2021 22:00) (70 - 84)  ABP: 96/51 (07 Jul 2021 22:00) (93/49 - 147/72)  ABP(mean): 66 (07 Jul 2021 22:00) (64 - 102)  RR: 16 (07 Jul 2021 22:00) (12 - 16)  SpO2: 99% (07 Jul 2021 22:00) (96% - 100%)    Adult Advanced Hemodynamics Last 24 Hrs  CVP(mm Hg): 7 (07 Jul 2021 22:00) (7 - 16)  CVP(cm H2O): --  CO: --  CI: --  PA: --  PA(mean): --  PCWP: --  SVR: --  SVRI: --  PVR: --  PVRI: --, ABG - ( 07 Jul 2021 22:10 )  pH, Arterial: 7.38  pH, Blood: x     /  pCO2: 32    /  pO2: 131   / HCO3: 19    / Base Excess: -5.2  /  SaO2: 99.0              Mode: AC/ CMV (Assist Control/ Continuous Mandatory Ventilation)  RR (machine): 12  TV (machine): 500  FiO2: 50  PEEP: 5  ITime: 1  MAP: 7  PIP: 15    Intake and output was reviewed and the fluid balance was calculated  Daily     Daily   I&O's Summary    07 Jul 2021 07:01  -  07 Jul 2021 23:09  --------------------------------------------------------  IN: 1438.1 mL / OUT: 1540 mL / NET: -101.9 mL        All lines and drain sites were assessed      Neuro: Follows commands. Moves all 4 extremities.  Neck: No JVD.  CVS: S1, S2, No S3.  Lungs: Diminished breath sounds on the right side.  Abd: Soft. No tenderness. Hypoactive Bowel sounds.  Vascular: + DP/PT.  Extremities: No edema.  Lymphatic: Normal.  Skin: No abnormalities.      labs  CBC Full  -  ( 07 Jul 2021 22:14 )  WBC Count : 13.39 K/uL  RBC Count : 2.67 M/uL  Hemoglobin : 8.4 g/dL  Hematocrit : 26.3 %  Platelet Count - Automated : 159 K/uL  Mean Cell Volume : 98.5 fl  Mean Cell Hemoglobin : 31.5 pg  Mean Cell Hemoglobin Concentration : 31.9 gm/dL  Auto Neutrophil # : x  Auto Lymphocyte # : x  Auto Monocyte # : x  Auto Eosinophil # : x  Auto Basophil # : x  Auto Neutrophil % : x  Auto Lymphocyte % : x  Auto Monocyte % : x  Auto Eosinophil % : x  Auto Basophil % : x    07-07    142  |  110<H>  |  18  ----------------------------<  141<H>  3.7   |  23  |  1.01    Ca    9.4      07 Jul 2021 22:14  Phos  1.3     07-07  Mg     2.3     07-07    TPro  5.5<L>  /  Alb  3.6  /  TBili  1.1  /  DBili  x   /  AST  46<H>  /  ALT  6<L>  /  AlkPhos  45  07-07    PT/INR - ( 07 Jul 2021 22:14 )   PT: 14.3 sec;   INR: 1.20          PTT - ( 07 Jul 2021 22:14 )  PTT:41.8 sec  The current medications were reviewed   MEDICATIONS  (STANDING):  aspirin  chewable 81 milliGRAM(s) Enteral Tube daily  BUpivacaine liposome 1.3% Injectable (no eMAR) 20 milliLiter(s) Local Injection once  chlorhexidine 0.12% Liquid 15 milliLiter(s) Oral Mucosa every 12 hours  chlorhexidine 2% Cloths 1 Application(s) Topical <User Schedule>  dexMEDEtomidine Infusion 0.5 MICROgram(s)/kG/Hr (8.05 mL/Hr) IV Continuous <Continuous>  dextrose 50% Injectable 50 milliLiter(s) IV Push every 15 minutes  dextrose 50% Injectable 25 milliLiter(s) IV Push every 15 minutes  heparin   Injectable 5000 Unit(s) SubCutaneous every 8 hours  HYDROmorphone  Injectable 0.5 milliGRAM(s) IV Push once  insulin regular Infusion 2 Unit(s)/Hr (2 mL/Hr) IV Continuous <Continuous>  milrinone Infusion 0.25 MICROgram(s)/kG/Min (4.83 mL/Hr) IV Continuous <Continuous>  niCARdipine Infusion 5 mG/Hr (25 mL/Hr) IV Continuous <Continuous>  norepinephrine Infusion 0.05 MICROgram(s)/kG/Min (6.04 mL/Hr) IV Continuous <Continuous>  pantoprazole  Injectable 40 milliGRAM(s) IV Push daily  potassium phosphate IVPB 30 milliMole(s) IV Intermittent once  sodium chloride 0.9%. 1000 milliLiter(s) (10 mL/Hr) IV Continuous <Continuous>  vancomycin  IVPB 1000 milliGRAM(s) IV Intermittent once            75y old  Male with severe Mitral Valve Regurgitation.  S/P Mitral valve repair.  Hypophosphatemia.  Hemodynamically stable.  Good oxygenation.  Fair urine out put.        My plan includes :  WEAN to Extubate.  D/C norepinephrine.  Wean off milrinone.  Resume Sinemet for Parkinsonism.   Statin and Betablocker.  Close hemodynamic, ventilatory and drain monitoring and management  Monitor for arrhythmias and monitor parameters for organ perfusion  Monitor neurologic status  Monitor renal function.  Head of the bed should remain elevated to 45 deg .   Chest PT and IS will be encouraged  Monitor adequacy of oxygenation and ventilation and attempt to wean oxygen  Nutritional goals will be met using po eventually , ensure adequate caloric intake and monitor the same  Stress ulcer and VTE prophylaxis will be achieved    Glycemic control is satisfactory  Electrolytes have been repleted as necessary and wound care has been carried out. Pain control has been achieved.   Aggressive physical therapy and early mobility and ambulation goals will be met   The family was updated about the course and plan  CRITICAL CARE TIME SPENT in evaluation and management, reassessments, review and interpretation of labs and x-rays, ventilator and hemodynamic management, formulating a plan and coordinating care: ___60____ MIN.  Time does not include procedural time.  CTICU ATTENDING     					    Oseas Logan MD

## 2021-07-08 LAB
ALBUMIN SERPL ELPH-MCNC: 3.7 G/DL — SIGNIFICANT CHANGE UP (ref 3.3–5)
ALP SERPL-CCNC: 30 U/L — LOW (ref 40–120)
ALT FLD-CCNC: 8 U/L — LOW (ref 10–45)
ANION GAP SERPL CALC-SCNC: 10 MMOL/L — SIGNIFICANT CHANGE UP (ref 5–17)
ANION GAP SERPL CALC-SCNC: 8 MMOL/L — SIGNIFICANT CHANGE UP (ref 5–17)
APTT BLD: 30.8 SEC — SIGNIFICANT CHANGE UP (ref 27.5–35.5)
AST SERPL-CCNC: 44 U/L — HIGH (ref 10–40)
BASE EXCESS BLDA CALC-SCNC: -2.6 MMOL/L — LOW (ref -2–3)
BASE EXCESS BLDV CALC-SCNC: -3.7 MMOL/L — LOW (ref -2–3)
BILIRUB SERPL-MCNC: 0.7 MG/DL — SIGNIFICANT CHANGE UP (ref 0.2–1.2)
BUN SERPL-MCNC: 19 MG/DL — SIGNIFICANT CHANGE UP (ref 7–23)
BUN SERPL-MCNC: 21 MG/DL — SIGNIFICANT CHANGE UP (ref 7–23)
CALCIUM SERPL-MCNC: 8.9 MG/DL — SIGNIFICANT CHANGE UP (ref 8.4–10.5)
CALCIUM SERPL-MCNC: 9.1 MG/DL — SIGNIFICANT CHANGE UP (ref 8.4–10.5)
CHLORIDE SERPL-SCNC: 106 MMOL/L — SIGNIFICANT CHANGE UP (ref 96–108)
CHLORIDE SERPL-SCNC: 110 MMOL/L — HIGH (ref 96–108)
CO2 BLDA-SCNC: 23 MMOL/L — SIGNIFICANT CHANGE UP (ref 19–24)
CO2 BLDV-SCNC: 23.4 MMOL/L — SIGNIFICANT CHANGE UP (ref 22–26)
CO2 SERPL-SCNC: 22 MMOL/L — SIGNIFICANT CHANGE UP (ref 22–31)
CO2 SERPL-SCNC: 25 MMOL/L — SIGNIFICANT CHANGE UP (ref 22–31)
CREAT SERPL-MCNC: 0.82 MG/DL — SIGNIFICANT CHANGE UP (ref 0.5–1.3)
CREAT SERPL-MCNC: 0.87 MG/DL — SIGNIFICANT CHANGE UP (ref 0.5–1.3)
GAS PNL BLDA: SIGNIFICANT CHANGE UP
GAS PNL BLDA: SIGNIFICANT CHANGE UP
GAS PNL BLDV: SIGNIFICANT CHANGE UP
GLUCOSE BLDC GLUCOMTR-MCNC: 101 MG/DL — HIGH (ref 70–99)
GLUCOSE BLDC GLUCOMTR-MCNC: 109 MG/DL — HIGH (ref 70–99)
GLUCOSE BLDC GLUCOMTR-MCNC: 113 MG/DL — HIGH (ref 70–99)
GLUCOSE BLDC GLUCOMTR-MCNC: 117 MG/DL — HIGH (ref 70–99)
GLUCOSE BLDC GLUCOMTR-MCNC: 119 MG/DL — HIGH (ref 70–99)
GLUCOSE BLDC GLUCOMTR-MCNC: 121 MG/DL — HIGH (ref 70–99)
GLUCOSE BLDC GLUCOMTR-MCNC: 123 MG/DL — HIGH (ref 70–99)
GLUCOSE BLDC GLUCOMTR-MCNC: 126 MG/DL — HIGH (ref 70–99)
GLUCOSE BLDC GLUCOMTR-MCNC: 130 MG/DL — HIGH (ref 70–99)
GLUCOSE BLDC GLUCOMTR-MCNC: 138 MG/DL — HIGH (ref 70–99)
GLUCOSE BLDC GLUCOMTR-MCNC: 160 MG/DL — HIGH (ref 70–99)
GLUCOSE BLDC GLUCOMTR-MCNC: 64 MG/DL — LOW (ref 70–99)
GLUCOSE BLDC GLUCOMTR-MCNC: 79 MG/DL — SIGNIFICANT CHANGE UP (ref 70–99)
GLUCOSE SERPL-MCNC: 153 MG/DL — HIGH (ref 70–99)
GLUCOSE SERPL-MCNC: 87 MG/DL — SIGNIFICANT CHANGE UP (ref 70–99)
HCO3 BLDA-SCNC: 22 MMOL/L — SIGNIFICANT CHANGE UP (ref 21–28)
HCO3 BLDV-SCNC: 22 MMOL/L — SIGNIFICANT CHANGE UP (ref 22–29)
HCT VFR BLD CALC: 21.7 % — LOW (ref 39–50)
HCT VFR BLD CALC: 23 % — LOW (ref 39–50)
HCT VFR BLD CALC: 28.1 % — LOW (ref 39–50)
HGB BLD-MCNC: 7.1 G/DL — LOW (ref 13–17)
HGB BLD-MCNC: 7.5 G/DL — LOW (ref 13–17)
HGB BLD-MCNC: 9.1 G/DL — LOW (ref 13–17)
INR BLD: 1.3 — HIGH (ref 0.88–1.16)
LACTATE SERPL-SCNC: 1.4 MMOL/L — SIGNIFICANT CHANGE UP (ref 0.5–2)
MAGNESIUM SERPL-MCNC: 2.2 MG/DL — SIGNIFICANT CHANGE UP (ref 1.6–2.6)
MCHC RBC-ENTMCNC: 30.8 PG — SIGNIFICANT CHANGE UP (ref 27–34)
MCHC RBC-ENTMCNC: 32 PG — SIGNIFICANT CHANGE UP (ref 27–34)
MCHC RBC-ENTMCNC: 32.3 PG — SIGNIFICANT CHANGE UP (ref 27–34)
MCHC RBC-ENTMCNC: 32.4 GM/DL — SIGNIFICANT CHANGE UP (ref 32–36)
MCHC RBC-ENTMCNC: 32.6 GM/DL — SIGNIFICANT CHANGE UP (ref 32–36)
MCHC RBC-ENTMCNC: 32.7 GM/DL — SIGNIFICANT CHANGE UP (ref 32–36)
MCV RBC AUTO: 95.3 FL — SIGNIFICANT CHANGE UP (ref 80–100)
MCV RBC AUTO: 97.7 FL — SIGNIFICANT CHANGE UP (ref 80–100)
MCV RBC AUTO: 99.1 FL — SIGNIFICANT CHANGE UP (ref 80–100)
NRBC # BLD: 0 /100 WBCS — SIGNIFICANT CHANGE UP (ref 0–0)
PCO2 BLDA: 36 MMHG — SIGNIFICANT CHANGE UP (ref 35–48)
PCO2 BLDV: 42 MMHG — SIGNIFICANT CHANGE UP (ref 42–55)
PH BLDA: 7.39 — SIGNIFICANT CHANGE UP (ref 7.35–7.45)
PH BLDV: 7.33 — SIGNIFICANT CHANGE UP (ref 7.32–7.43)
PHOSPHATE SERPL-MCNC: 2.9 MG/DL — SIGNIFICANT CHANGE UP (ref 2.5–4.5)
PLATELET # BLD AUTO: 139 K/UL — LOW (ref 150–400)
PLATELET # BLD AUTO: 155 K/UL — SIGNIFICANT CHANGE UP (ref 150–400)
PLATELET # BLD AUTO: 157 K/UL — SIGNIFICANT CHANGE UP (ref 150–400)
PO2 BLDA: 106 MMHG — SIGNIFICANT CHANGE UP (ref 83–108)
PO2 BLDV: 40 MMHG — SIGNIFICANT CHANGE UP
POTASSIUM SERPL-MCNC: 4.3 MMOL/L — SIGNIFICANT CHANGE UP (ref 3.5–5.3)
POTASSIUM SERPL-MCNC: 4.3 MMOL/L — SIGNIFICANT CHANGE UP (ref 3.5–5.3)
POTASSIUM SERPL-SCNC: 4.3 MMOL/L — SIGNIFICANT CHANGE UP (ref 3.5–5.3)
POTASSIUM SERPL-SCNC: 4.3 MMOL/L — SIGNIFICANT CHANGE UP (ref 3.5–5.3)
PROT SERPL-MCNC: 5.6 G/DL — LOW (ref 6–8.3)
PROTHROM AB SERPL-ACNC: 15.4 SEC — HIGH (ref 10.6–13.6)
RBC # BLD: 2.22 M/UL — LOW (ref 4.2–5.8)
RBC # BLD: 2.32 M/UL — LOW (ref 4.2–5.8)
RBC # BLD: 2.95 M/UL — LOW (ref 4.2–5.8)
RBC # FLD: 13.3 % — SIGNIFICANT CHANGE UP (ref 10.3–14.5)
RBC # FLD: 13.4 % — SIGNIFICANT CHANGE UP (ref 10.3–14.5)
RBC # FLD: 15.5 % — HIGH (ref 10.3–14.5)
SAO2 % BLDA: 98.8 % — HIGH (ref 94–98)
SAO2 % BLDV: 65.8 % — SIGNIFICANT CHANGE UP
SODIUM SERPL-SCNC: 138 MMOL/L — SIGNIFICANT CHANGE UP (ref 135–145)
SODIUM SERPL-SCNC: 143 MMOL/L — SIGNIFICANT CHANGE UP (ref 135–145)
WBC # BLD: 10.97 K/UL — HIGH (ref 3.8–10.5)
WBC # BLD: 12.4 K/UL — HIGH (ref 3.8–10.5)
WBC # BLD: 17 K/UL — HIGH (ref 3.8–10.5)
WBC # FLD AUTO: 10.97 K/UL — HIGH (ref 3.8–10.5)
WBC # FLD AUTO: 12.4 K/UL — HIGH (ref 3.8–10.5)
WBC # FLD AUTO: 17 K/UL — HIGH (ref 3.8–10.5)

## 2021-07-08 PROCEDURE — 99292 CRITICAL CARE ADDL 30 MIN: CPT

## 2021-07-08 PROCEDURE — 71045 X-RAY EXAM CHEST 1 VIEW: CPT | Mod: 26

## 2021-07-08 PROCEDURE — 99291 CRITICAL CARE FIRST HOUR: CPT

## 2021-07-08 PROCEDURE — 71045 X-RAY EXAM CHEST 1 VIEW: CPT | Mod: 26,77

## 2021-07-08 RX ORDER — CARBIDOPA AND LEVODOPA 25; 100 MG/1; MG/1
1 TABLET ORAL THREE TIMES A DAY
Refills: 0 | Status: DISCONTINUED | OUTPATIENT
Start: 2021-07-08 | End: 2021-07-11

## 2021-07-08 RX ORDER — LANOLIN ALCOHOL/MO/W.PET/CERES
3 CREAM (GRAM) TOPICAL AT BEDTIME
Refills: 0 | Status: DISCONTINUED | OUTPATIENT
Start: 2021-07-08 | End: 2021-07-11

## 2021-07-08 RX ORDER — GABAPENTIN 400 MG/1
300 CAPSULE ORAL AT BEDTIME
Refills: 0 | Status: DISCONTINUED | OUTPATIENT
Start: 2021-07-08 | End: 2021-07-08

## 2021-07-08 RX ORDER — METOPROLOL TARTRATE 50 MG
25 TABLET ORAL EVERY 6 HOURS
Refills: 0 | Status: DISCONTINUED | OUTPATIENT
Start: 2021-07-08 | End: 2021-07-10

## 2021-07-08 RX ORDER — HYOSCYAMINE SULFATE 0.13 MG
0.12 TABLET ORAL
Refills: 0 | Status: DISCONTINUED | OUTPATIENT
Start: 2021-07-08 | End: 2021-07-11

## 2021-07-08 RX ORDER — ALBUMIN HUMAN 25 %
250 VIAL (ML) INTRAVENOUS ONCE
Refills: 0 | Status: COMPLETED | OUTPATIENT
Start: 2021-07-08 | End: 2021-07-08

## 2021-07-08 RX ORDER — PANTOPRAZOLE SODIUM 20 MG/1
40 TABLET, DELAYED RELEASE ORAL
Refills: 0 | Status: DISCONTINUED | OUTPATIENT
Start: 2021-07-09 | End: 2021-07-11

## 2021-07-08 RX ORDER — ALBUMIN HUMAN 25 %
250 VIAL (ML) INTRAVENOUS
Refills: 0 | Status: COMPLETED | OUTPATIENT
Start: 2021-07-08 | End: 2021-07-08

## 2021-07-08 RX ORDER — ASPIRIN/CALCIUM CARB/MAGNESIUM 324 MG
81 TABLET ORAL DAILY
Refills: 0 | Status: DISCONTINUED | OUTPATIENT
Start: 2021-07-09 | End: 2021-07-11

## 2021-07-08 RX ORDER — HYOSCYAMINE SULFATE 0.13 MG
0.12 TABLET ORAL
Refills: 0 | Status: DISCONTINUED | OUTPATIENT
Start: 2021-07-08 | End: 2021-07-08

## 2021-07-08 RX ORDER — FINASTERIDE 5 MG/1
5 TABLET, FILM COATED ORAL DAILY
Refills: 0 | Status: DISCONTINUED | OUTPATIENT
Start: 2021-07-08 | End: 2021-07-11

## 2021-07-08 RX ORDER — GABAPENTIN 400 MG/1
300 CAPSULE ORAL THREE TIMES A DAY
Refills: 0 | Status: DISCONTINUED | OUTPATIENT
Start: 2021-07-08 | End: 2021-07-11

## 2021-07-08 RX ORDER — CARBIDOPA AND LEVODOPA 25; 100 MG/1; MG/1
1 TABLET ORAL EVERY 6 HOURS
Refills: 0 | Status: DISCONTINUED | OUTPATIENT
Start: 2021-07-08 | End: 2021-07-08

## 2021-07-08 RX ORDER — DEXTROSE 50 % IN WATER 50 %
12.5 SYRINGE (ML) INTRAVENOUS ONCE
Refills: 0 | Status: DISCONTINUED | OUTPATIENT
Start: 2021-07-08 | End: 2021-07-09

## 2021-07-08 RX ORDER — INSULIN LISPRO 100/ML
VIAL (ML) SUBCUTANEOUS
Refills: 0 | Status: DISCONTINUED | OUTPATIENT
Start: 2021-07-08 | End: 2021-07-09

## 2021-07-08 RX ORDER — OXYCODONE AND ACETAMINOPHEN 5; 325 MG/1; MG/1
1 TABLET ORAL EVERY 4 HOURS
Refills: 0 | Status: DISCONTINUED | OUTPATIENT
Start: 2021-07-08 | End: 2021-07-11

## 2021-07-08 RX ORDER — ACETAMINOPHEN 500 MG
650 TABLET ORAL EVERY 6 HOURS
Refills: 0 | Status: DISCONTINUED | OUTPATIENT
Start: 2021-07-08 | End: 2021-07-11

## 2021-07-08 RX ORDER — HYDROMORPHONE HYDROCHLORIDE 2 MG/ML
0.5 INJECTION INTRAMUSCULAR; INTRAVENOUS; SUBCUTANEOUS ONCE
Refills: 0 | Status: DISCONTINUED | OUTPATIENT
Start: 2021-07-08 | End: 2021-07-08

## 2021-07-08 RX ORDER — DEXTROSE 50 % IN WATER 50 %
25 SYRINGE (ML) INTRAVENOUS ONCE
Refills: 0 | Status: DISCONTINUED | OUTPATIENT
Start: 2021-07-08 | End: 2021-07-09

## 2021-07-08 RX ORDER — TAMSULOSIN HYDROCHLORIDE 0.4 MG/1
0.4 CAPSULE ORAL AT BEDTIME
Refills: 0 | Status: DISCONTINUED | OUTPATIENT
Start: 2021-07-08 | End: 2021-07-11

## 2021-07-08 RX ADMIN — CARBIDOPA AND LEVODOPA 1 TABLET(S): 25; 100 TABLET ORAL at 13:21

## 2021-07-08 RX ADMIN — MILRINONE LACTATE 2.42 MICROGRAM(S)/KG/MIN: 1 INJECTION, SOLUTION INTRAVENOUS at 14:43

## 2021-07-08 RX ADMIN — HYDROMORPHONE HYDROCHLORIDE 0.5 MILLIGRAM(S): 2 INJECTION INTRAMUSCULAR; INTRAVENOUS; SUBCUTANEOUS at 05:21

## 2021-07-08 RX ADMIN — TAMSULOSIN HYDROCHLORIDE 0.4 MILLIGRAM(S): 0.4 CAPSULE ORAL at 21:07

## 2021-07-08 RX ADMIN — HYDROMORPHONE HYDROCHLORIDE 0.5 MILLIGRAM(S): 2 INJECTION INTRAMUSCULAR; INTRAVENOUS; SUBCUTANEOUS at 11:15

## 2021-07-08 RX ADMIN — Medication 25 MILLIGRAM(S): at 21:20

## 2021-07-08 RX ADMIN — FINASTERIDE 5 MILLIGRAM(S): 5 TABLET, FILM COATED ORAL at 11:59

## 2021-07-08 RX ADMIN — HEPARIN SODIUM 5000 UNIT(S): 5000 INJECTION INTRAVENOUS; SUBCUTANEOUS at 05:19

## 2021-07-08 RX ADMIN — HEPARIN SODIUM 5000 UNIT(S): 5000 INJECTION INTRAVENOUS; SUBCUTANEOUS at 21:07

## 2021-07-08 RX ADMIN — OXYCODONE AND ACETAMINOPHEN 1 TABLET(S): 5; 325 TABLET ORAL at 11:00

## 2021-07-08 RX ADMIN — MILRINONE LACTATE 4.83 MICROGRAM(S)/KG/MIN: 1 INJECTION, SOLUTION INTRAVENOUS at 06:25

## 2021-07-08 RX ADMIN — OXYCODONE AND ACETAMINOPHEN 1 TABLET(S): 5; 325 TABLET ORAL at 10:34

## 2021-07-08 RX ADMIN — Medication 81 MILLIGRAM(S): at 11:59

## 2021-07-08 RX ADMIN — HYDROMORPHONE HYDROCHLORIDE 0.5 MILLIGRAM(S): 2 INJECTION INTRAMUSCULAR; INTRAVENOUS; SUBCUTANEOUS at 05:35

## 2021-07-08 RX ADMIN — CARBIDOPA AND LEVODOPA 1 TABLET(S): 25; 100 TABLET ORAL at 21:07

## 2021-07-08 RX ADMIN — PANTOPRAZOLE SODIUM 40 MILLIGRAM(S): 20 TABLET, DELAYED RELEASE ORAL at 12:00

## 2021-07-08 RX ADMIN — HEPARIN SODIUM 5000 UNIT(S): 5000 INJECTION INTRAVENOUS; SUBCUTANEOUS at 13:21

## 2021-07-08 RX ADMIN — CHLORHEXIDINE GLUCONATE 15 MILLILITER(S): 213 SOLUTION TOPICAL at 05:19

## 2021-07-08 RX ADMIN — Medication 2: at 12:00

## 2021-07-08 RX ADMIN — Medication 125 MILLILITER(S): at 12:00

## 2021-07-08 RX ADMIN — GABAPENTIN 300 MILLIGRAM(S): 400 CAPSULE ORAL at 13:21

## 2021-07-08 RX ADMIN — HYDROMORPHONE HYDROCHLORIDE 0.5 MILLIGRAM(S): 2 INJECTION INTRAMUSCULAR; INTRAVENOUS; SUBCUTANEOUS at 10:58

## 2021-07-08 RX ADMIN — Medication 125 MILLILITER(S): at 18:51

## 2021-07-08 RX ADMIN — CARBIDOPA AND LEVODOPA 1 TABLET(S): 25; 100 TABLET ORAL at 05:54

## 2021-07-08 RX ADMIN — Medication 125 MILLILITER(S): at 17:11

## 2021-07-08 RX ADMIN — GABAPENTIN 300 MILLIGRAM(S): 400 CAPSULE ORAL at 21:07

## 2021-07-08 RX ADMIN — CHLORHEXIDINE GLUCONATE 1 APPLICATION(S): 213 SOLUTION TOPICAL at 05:19

## 2021-07-08 NOTE — PHYSICAL THERAPY INITIAL EVALUATION ADULT - ADDITIONAL COMMENTS
PT lives in house with 7+& steps inside, with wife. Pt indpt with ADLs/iADLs, community errands. Pt owns cane. Denies falls, uses recumbent bike. Amb tolerance 1/2 -3/4 block.

## 2021-07-08 NOTE — PHYSICAL THERAPY INITIAL EVALUATION ADULT - IMPAIRMENTS CONTRIBUTING TO GAIT DEVIATIONS, PT EVAL
requried 2 seated rest breaks, 1 standing rest break/impaired balance/impaired motor control/pain/impaired postural control/decreased ROM/decreased strength

## 2021-07-08 NOTE — PROGRESS NOTE ADULT - SUBJECTIVE AND OBJECTIVE BOX
CTICU  CRITICAL  CARE  attending     Hand off received 					   Pertinent clinical, laboratory, radiographic, hemodynamic, echocardiographic, respiratory data, microbiologic data and chart were reviewed and analyzed frequently throughout the course of the day and night      76 yo male with HLD, Parkinson's disease, BPH s/p prostatectomy,  osteoarthritis s/p right TKR, ulcerative colitis, Small Bowel Obstruction with multiple laparotomies and small bowel resections.  He has long standing history of mitral valve prolapse with severe mitral regurgitation.   The patient has been followed by cardiologist, Dr. Warren Lee with serial echocardiograms.   He reports worsening SOB/YO and decreased exercise tolerance over past few months. He denies c/o chest pain, sob at rest, fever, lower extremity, syncope or palpitations.   YURY revealed severe prolapse of mitral valve with severe regurgitation.   Elective Cardiac cath at [facility]  revealed normal coronary arteries.    S/P Mitral valve repair.      FAMILY HISTORY:  Known health problems: none (Father, Mother)    PAST MEDICAL & SURGICAL HISTORY:  GERD (gastroesophageal reflux disease)  History of mitral valve prolapse  Parkinson disease  Ulcerative colitis  BPH associated with nocturia  SBO (small bowel obstruction)  H/O inguinal hernia repair  S/P small bowel resection  S/P total knee replacement, right          14 system review was unremarkable    Vital signs, hemodynamic and respiratory parameters were reviewed from the bedside nursing flow sheet.  ICU Vital Signs Last 24 Hrs  T(C): 36.6 (08 Jul 2021 22:19), Max: 36.8 (08 Jul 2021 17:11)  T(F): 97.9 (08 Jul 2021 22:19), Max: 98.2 (08 Jul 2021 17:11)  HR: 84 (08 Jul 2021 22:00) (74 - 96)  BP: 158/74 (08 Jul 2021 22:00) (141/60 - 164/75)  BP(mean): 107 (08 Jul 2021 22:00) (87 - 108)  ABP: 169/67 (08 Jul 2021 22:00) (111/51 - 183/74)  ABP(mean): 101 (08 Jul 2021 22:00) (69 - 108)  RR: 18 (08 Jul 2021 22:00) (16 - 20)  SpO2: 100% (08 Jul 2021 22:00) (90% - 100%)    Adult Advanced Hemodynamics Last 24 Hrs  CVP(mm Hg): 2 (08 Jul 2021 22:00) (2 - 15)  CVP(cm H2O): --  CO: --  CI: --  PA: --  PA(mean): --  PCWP: --  SVR: --  SVRI: --  PVR: --  PVRI: --, ABG - ( 08 Jul 2021 13:55 )  pH, Arterial: 7.37  pH, Blood: x     /  pCO2: 33    /  pO2: 77    / HCO3: 19    / Base Excess: -5.3  /  SaO2: 98.5                Intake and output was reviewed and the fluid balance was calculated  Daily     Daily   I&O's Summary    07 Jul 2021 07:01  -  08 Jul 2021 07:00  --------------------------------------------------------  IN: 2690.9 mL / OUT: 2545 mL / NET: 145.9 mL    08 Jul 2021 07:01  -  08 Jul 2021 22:21  --------------------------------------------------------  IN: 1470.8 mL / OUT: 860 mL / NET: 610.8 mL        All lines and drain sites were assessed    Neuro: No change in the mental status from the baseline. Follows commands.  Moves all 4 extremities.  Neck: No JVD.  CVS: S1, S2, No S3.  Lungs: Good air entry bilaterally.  Abd: Soft. No tenderness. + Bowel sounds.  Vascular: + DP/PT.  Extremities: No edema.  Lymphatic: Normal.  Skin: No abnormalities.      labs  CBC Full  -  ( 08 Jul 2021 14:04 )  WBC Count : 17.00 K/uL  RBC Count : 2.95 M/uL  Hemoglobin : 9.1 g/dL  Hematocrit : 28.1 %  Platelet Count - Automated : 155 K/uL  Mean Cell Volume : 95.3 fl  Mean Cell Hemoglobin : 30.8 pg  Mean Cell Hemoglobin Concentration : 32.4 gm/dL  Auto Neutrophil # : x  Auto Lymphocyte # : x  Auto Monocyte # : x  Auto Eosinophil # : x  Auto Basophil # : x  Auto Neutrophil % : x  Auto Lymphocyte % : x  Auto Monocyte % : x  Auto Eosinophil % : x  Auto Basophil % : x    07-08    138  |  106  |  21  ----------------------------<  153<H>  4.3   |  22  |  0.87    Ca    9.1      08 Jul 2021 14:04  Phos  2.9     07-08  Mg     2.2     07-08    TPro  5.6<L>  /  Alb  3.7  /  TBili  0.7  /  DBili  x   /  AST  44<H>  /  ALT  8<L>  /  AlkPhos  30<L>  07-08    PT/INR - ( 08 Jul 2021 02:23 )   PT: 15.4 sec;   INR: 1.30          PTT - ( 08 Jul 2021 02:23 )  PTT:30.8 sec  The current medications were reviewed   MEDICATIONS  (STANDING):  carbidopa/levodopa  25/100 1 Tablet(s) Oral three times a day  chlorhexidine 2% Cloths 1 Application(s) Topical <User Schedule>  dextrose 50% Injectable 12.5 Gram(s) IV Push once  dextrose 50% Injectable 25 Gram(s) IV Push once  finasteride 5 milliGRAM(s) Oral daily  gabapentin 300 milliGRAM(s) Oral three times a day  heparin   Injectable 5000 Unit(s) SubCutaneous every 8 hours  insulin lispro (ADMELOG) corrective regimen sliding scale   SubCutaneous Before meals and at bedtime  metoprolol tartrate 25 milliGRAM(s) Oral every 6 hours  milrinone Infusion 0.125 MICROgram(s)/kG/Min (2.42 mL/Hr) IV Continuous <Continuous>  norepinephrine Infusion 0.05 MICROgram(s)/kG/Min (6.04 mL/Hr) IV Continuous <Continuous>  sodium chloride 0.9%. 1000 milliLiter(s) (10 mL/Hr) IV Continuous <Continuous>  tamsulosin 0.4 milliGRAM(s) Oral at bedtime    MEDICATIONS  (PRN):  acetaminophen   Tablet .. 650 milliGRAM(s) Oral every 6 hours PRN Mild Pain (1 - 3)  hyoscyamine SL 0.125 milliGRAM(s) SubLingual two times a day PRN bowel cramping  melatonin 3 milliGRAM(s) Oral at bedtime PRN Insomnia  oxycodone    5 mG/acetaminophen 325 mG 1 Tablet(s) Oral every 4 hours PRN Moderate Pain (4 - 6)               75y old  Male with severe Mitral Valve Regurgitation.  S/P Mitral valve repair.  S/P CABG  Hemodynamically stable.  Good oxygenation.  Fair urine out put.        My plan includes :  Continue anti Parkinson meds.  D/C milrinone.  Statin and Betablocker.  Close hemodynamic, ventilatory and drain monitoring and management  Monitor for arrhythmias and monitor parameters for organ perfusion  Monitor neurologic status  Monitor renal function.  Head of the bed should remain elevated to 45 deg .   Chest PT and IS will be encouraged  Monitor adequacy of oxygenation and ventilation and attempt to wean oxygen  Nutritional goals will be met using po eventually , ensure adequate caloric intake and monitor the same  Stress ulcer and VTE prophylaxis will be achieved    Glycemic control is satisfactory  Electrolytes have been repleted as necessary and wound care has been carried out. Pain control has been achieved.   Aggressive physical therapy and early mobility and ambulation goals will be met   The family was updated about the course and plan  CRITICAL CARE TIME SPENT in evaluation and management, reassessments, review and interpretation of labs and x-rays, ventilator and hemodynamic management, formulating a plan and coordinating care: ___30____ MIN.  Time does not include procedural time.  CTICU ATTENDING     					    Oseas Logan MD

## 2021-07-08 NOTE — PHYSICAL THERAPY INITIAL EVALUATION ADULT - PERTINENT HX OF CURRENT PROBLEM, REHAB EVAL
75 year old male with history of HLD, Parkinsons disease and mitral valve prolapse presents with severe MR with worsening SOB/YO.

## 2021-07-08 NOTE — PROGRESS NOTE ADULT - SUBJECTIVE AND OBJECTIVE BOX
CTICU  CRITICAL  CARE  attending     Hand off received 					   Pertinent clinical, laboratory, radiographic, hemodynamic, echocardiographic, respiratory data, microbiologic data and chart were reviewed and analyzed frequently throughout the course of the day and night  Patient seen and examined with CTS/ SH attending at bedside  Pt is a 75y , Male, HEALTH ISSUES - PROBLEM Dx:      , FAMILY HISTORY:  Known health problems: none (Father, Mother)    PAST MEDICAL & SURGICAL HISTORY:  GERD (gastroesophageal reflux disease)    History of mitral valve prolapse    Parkinson disease    Ulcerative colitis    BPH associated with nocturia    SBO (small bowel obstruction)    H/O inguinal hernia repair    S/P small bowel resection    S/P total knee replacement, right      Patient is a 75y old  Male who presents with a chief complaint of Mitral Valve Regurgitation (07 Jul 2021 22:30)      14 system review limited by mentation and multiorgan morbidity     Vital signs, hemodynamic and respiratory parameters were reviewed from the bedside nursing flowsheet.  ICU Vital Signs Last 24 Hrs  T(C): 36.2 (08 Jul 2021 12:39), Max: 36.4 (07 Jul 2021 22:32)  T(F): 97.1 (08 Jul 2021 12:39), Max: 97.6 (08 Jul 2021 01:01)  HR: 85 (08 Jul 2021 17:00) (74 - 96)  BP: 141/60 (08 Jul 2021 10:25) (94/49 - 141/60)  BP(mean): 87 (08 Jul 2021 10:25) (70 - 87)  ABP: 155/66 (08 Jul 2021 17:00) (93/49 - 155/66)  ABP(mean): 93 (08 Jul 2021 17:00) (64 - 93)  RR: 18 (08 Jul 2021 17:00) (12 - 20)  SpO2: 92% (08 Jul 2021 17:00) (90% - 100%)    Adult Advanced Hemodynamics Last 24 Hrs  CVP(mm Hg): 7 (08 Jul 2021 17:00) (2 - 16)  CVP(cm H2O): --  CO: --  CI: --  PA: --  PA(mean): --  PCWP: --  SVR: --  SVRI: --  PVR: --  PVRI: --, ABG - ( 08 Jul 2021 13:55 )  pH, Arterial: 7.37  pH, Blood: x     /  pCO2: 33    /  pO2: 77    / HCO3: 19    / Base Excess: -5.3  /  SaO2: 98.5              Mode: AC/ CMV (Assist Control/ Continuous Mandatory Ventilation)  RR (machine): 12  TV (machine): 500  FiO2: 50  PEEP: 5  ITime: 1  MAP: 8  PIP: 16    Intake and output was reviewed and the fluid balance was calculated  Daily     Daily   I&O's Summary    07 Jul 2021 07:01  -  08 Jul 2021 07:00  --------------------------------------------------------  IN: 2690.9 mL / OUT: 2545 mL / NET: 145.9 mL    08 Jul 2021 07:01  -  08 Jul 2021 17:09  --------------------------------------------------------  IN: 468.8 mL / OUT: 330 mL / NET: 138.8 mL        All lines and drain sites were assessed  [facility]      POCT Blood Glucose.: 113 mg/dL (08 Jul 2021 16:02)    No acute change in focality  Auscultation of the chest reveals equal bs  Abdomen is soft  Extremities are warm and well perfused  Wounds appear clean and unremarkable  Antibiotics are periop    labs  CBC Full  -  ( 08 Jul 2021 14:04 )  WBC Count : 17.00 K/uL  RBC Count : 2.95 M/uL  Hemoglobin : 9.1 g/dL  Hematocrit : 28.1 %  Platelet Count - Automated : 155 K/uL  Mean Cell Volume : 95.3 fl  Mean Cell Hemoglobin : 30.8 pg  Mean Cell Hemoglobin Concentration : 32.4 gm/dL  Auto Neutrophil # : x  Auto Lymphocyte # : x  Auto Monocyte # : x  Auto Eosinophil # : x  Auto Basophil # : x  Auto Neutrophil % : x  Auto Lymphocyte % : x  Auto Monocyte % : x  Auto Eosinophil % : x  Auto Basophil % : x    07-08    138  |  106  |  21  ----------------------------<  153<H>  4.3   |  22  |  0.87    Ca    9.1      08 Jul 2021 14:04  Phos  2.9     07-08  Mg     2.2     07-08    TPro  5.6<L>  /  Alb  3.7  /  TBili  0.7  /  DBili  x   /  AST  44<H>  /  ALT  8<L>  /  AlkPhos  30<L>  07-08    PT/INR - ( 08 Jul 2021 02:23 )   PT: 15.4 sec;   INR: 1.30          PTT - ( 08 Jul 2021 02:23 )  PTT:30.8 sec  The current medications were reviewed   MEDICATIONS  (STANDING):  albumin human  5% IVPB 250 milliLiter(s) IV Intermittent every 1 hour  carbidopa/levodopa  25/100 1 Tablet(s) Oral three times a day  chlorhexidine 2% Cloths 1 Application(s) Topical <User Schedule>  dextrose 50% Injectable 12.5 Gram(s) IV Push once  dextrose 50% Injectable 25 Gram(s) IV Push once  finasteride 5 milliGRAM(s) Oral daily  gabapentin 300 milliGRAM(s) Oral three times a day  heparin   Injectable 5000 Unit(s) SubCutaneous every 8 hours  insulin lispro (ADMELOG) corrective regimen sliding scale   SubCutaneous Before meals and at bedtime  milrinone Infusion 0.125 MICROgram(s)/kG/Min (2.42 mL/Hr) IV Continuous <Continuous>  norepinephrine Infusion 0.05 MICROgram(s)/kG/Min (6.04 mL/Hr) IV Continuous <Continuous>  sodium chloride 0.9%. 1000 milliLiter(s) (10 mL/Hr) IV Continuous <Continuous>  tamsulosin 0.4 milliGRAM(s) Oral at bedtime    MEDICATIONS  (PRN):  acetaminophen   Tablet .. 650 milliGRAM(s) Oral every 6 hours PRN Mild Pain (1 - 3)  hyoscyamine SL 0.125 milliGRAM(s) SubLingual two times a day PRN bowel cramping  melatonin 3 milliGRAM(s) Oral at bedtime PRN Insomnia  oxycodone    5 mG/acetaminophen 325 mG 1 Tablet(s) Oral every 4 hours PRN Moderate Pain (4 - 6)       PROBLEM LIST/ ASSESSMENT:  HEALTH ISSUES - PROBLEM Dx:      ,   Patient is a 75y old  Male who presents with a chief complaint of Mitral Valve Regurgitation (07 Jul 2021 22:30)     s/p cardiac surgery                My plan includes :  close hemodynamic, ventilatory and drain monitoring and management per post op routine    Monitor for arrhythmias and monitor parameters for organ perfusion  beta blockade not administered due to hemodynamic instability and bradycardia  monitor neurologic status  Head of the bed should remain elevated to 45 deg .   chest PT and IS will be encouraged  monitor adequacy of oxygenation and ventilation and attempt to wean oxygen  antibiotic regimen will be tailored to the clinical, laboratory and microbiologic data  Nutritional goals will be met using po eventually , ensure adequate caloric intake and montior the same  Stress ulcer and VTE prophylaxis will be achieved    Glycemic control is satisfactory  Electrolytes have been repleted as necessary and wound care has been carried out. Pain control has been achieved.   agressive physical therapy and early mobility and ambulation goals will be met   The family was updated about the course and plan  CRITICAL CARE TIME personally provided by me  in evaluation and management, reassessments, review and interpretation of labs and x-rays, ventilator and hemodynamic management, formulating a plan and coordinating care: ___90____ MIN.  Time does not include procedural time. Time spent was non routine post-operarive caRE and included multiple and repeated evaluations at the bedside  CTICU ATTENDING     					    Oscar Buckley MD

## 2021-07-08 NOTE — PHYSICAL THERAPY INITIAL EVALUATION ADULT - GENERAL OBSERVATIONS, REHAB EVAL
Spoke to ZACHERY Contreras, pt cleared for PT. Pt rcvd OOB to chair, +tele, +[place], +chest tube to wall suction, +irina, +gonzalez, +SCDs, +2L NC. POD #1 MIDCAB MV repair.  Pt agreeable to PT, A&Ox4, reports 5/10 pain. Tolerated session fairly well

## 2021-07-08 NOTE — PHYSICAL THERAPY INITIAL EVALUATION ADULT - PLANNED THERAPY INTERVENTIONS, PT EVAL
balance training/bed mobility training/gait training/joint mobilization/postural re-education/ROM/strengthening/transfer training

## 2021-07-09 PROBLEM — Z09 POSTOP CHECK: Status: ACTIVE | Noted: 2021-07-09

## 2021-07-09 PROBLEM — Z98.890 S/P MITRAL VALVE REPAIR: Status: ACTIVE | Noted: 2021-07-09

## 2021-07-09 LAB
ALBUMIN SERPL ELPH-MCNC: 3.8 G/DL — SIGNIFICANT CHANGE UP (ref 3.3–5)
ALBUMIN SERPL ELPH-MCNC: 4.1 G/DL — SIGNIFICANT CHANGE UP (ref 3.3–5)
ALP SERPL-CCNC: 32 U/L — LOW (ref 40–120)
ALP SERPL-CCNC: 35 U/L — LOW (ref 40–120)
ALT FLD-CCNC: 10 U/L — SIGNIFICANT CHANGE UP (ref 10–45)
ALT FLD-CCNC: 5 U/L — LOW (ref 10–45)
ANION GAP SERPL CALC-SCNC: 10 MMOL/L — SIGNIFICANT CHANGE UP (ref 5–17)
ANION GAP SERPL CALC-SCNC: 7 MMOL/L — SIGNIFICANT CHANGE UP (ref 5–17)
APTT BLD: 32 SEC — SIGNIFICANT CHANGE UP (ref 27.5–35.5)
APTT BLD: 35.7 SEC — HIGH (ref 27.5–35.5)
AST SERPL-CCNC: 27 U/L — SIGNIFICANT CHANGE UP (ref 10–40)
AST SERPL-CCNC: 30 U/L — SIGNIFICANT CHANGE UP (ref 10–40)
BILIRUB SERPL-MCNC: 0.8 MG/DL — SIGNIFICANT CHANGE UP (ref 0.2–1.2)
BILIRUB SERPL-MCNC: 1.1 MG/DL — SIGNIFICANT CHANGE UP (ref 0.2–1.2)
BUN SERPL-MCNC: 21 MG/DL — SIGNIFICANT CHANGE UP (ref 7–23)
BUN SERPL-MCNC: 24 MG/DL — HIGH (ref 7–23)
CALCIUM SERPL-MCNC: 9.4 MG/DL — SIGNIFICANT CHANGE UP (ref 8.4–10.5)
CALCIUM SERPL-MCNC: 9.7 MG/DL — SIGNIFICANT CHANGE UP (ref 8.4–10.5)
CHLORIDE SERPL-SCNC: 105 MMOL/L — SIGNIFICANT CHANGE UP (ref 96–108)
CHLORIDE SERPL-SCNC: 105 MMOL/L — SIGNIFICANT CHANGE UP (ref 96–108)
CO2 SERPL-SCNC: 23 MMOL/L — SIGNIFICANT CHANGE UP (ref 22–31)
CO2 SERPL-SCNC: 25 MMOL/L — SIGNIFICANT CHANGE UP (ref 22–31)
CREAT SERPL-MCNC: 0.77 MG/DL — SIGNIFICANT CHANGE UP (ref 0.5–1.3)
CREAT SERPL-MCNC: 0.8 MG/DL — SIGNIFICANT CHANGE UP (ref 0.5–1.3)
GAS PNL BLDA: SIGNIFICANT CHANGE UP
GAS PNL BLDA: SIGNIFICANT CHANGE UP
GLUCOSE BLDC GLUCOMTR-MCNC: 115 MG/DL — HIGH (ref 70–99)
GLUCOSE BLDC GLUCOMTR-MCNC: 116 MG/DL — HIGH (ref 70–99)
GLUCOSE SERPL-MCNC: 133 MG/DL — HIGH (ref 70–99)
GLUCOSE SERPL-MCNC: 139 MG/DL — HIGH (ref 70–99)
HCT VFR BLD CALC: 27.7 % — LOW (ref 39–50)
HCT VFR BLD CALC: 27.8 % — LOW (ref 39–50)
HGB BLD-MCNC: 9.1 G/DL — LOW (ref 13–17)
HGB BLD-MCNC: 9.1 G/DL — LOW (ref 13–17)
INR BLD: 1.14 — SIGNIFICANT CHANGE UP (ref 0.88–1.16)
INR BLD: 1.18 — HIGH (ref 0.88–1.16)
MAGNESIUM SERPL-MCNC: 2.1 MG/DL — SIGNIFICANT CHANGE UP (ref 1.6–2.6)
MAGNESIUM SERPL-MCNC: 2.1 MG/DL — SIGNIFICANT CHANGE UP (ref 1.6–2.6)
MCHC RBC-ENTMCNC: 31.1 PG — SIGNIFICANT CHANGE UP (ref 27–34)
MCHC RBC-ENTMCNC: 31.5 PG — SIGNIFICANT CHANGE UP (ref 27–34)
MCHC RBC-ENTMCNC: 32.7 GM/DL — SIGNIFICANT CHANGE UP (ref 32–36)
MCHC RBC-ENTMCNC: 32.9 GM/DL — SIGNIFICANT CHANGE UP (ref 32–36)
MCV RBC AUTO: 94.9 FL — SIGNIFICANT CHANGE UP (ref 80–100)
MCV RBC AUTO: 95.8 FL — SIGNIFICANT CHANGE UP (ref 80–100)
NRBC # BLD: 0 /100 WBCS — SIGNIFICANT CHANGE UP (ref 0–0)
NRBC # BLD: 0 /100 WBCS — SIGNIFICANT CHANGE UP (ref 0–0)
PHOSPHATE SERPL-MCNC: 1.9 MG/DL — LOW (ref 2.5–4.5)
PHOSPHATE SERPL-MCNC: 2 MG/DL — LOW (ref 2.5–4.5)
PLATELET # BLD AUTO: 130 K/UL — LOW (ref 150–400)
PLATELET # BLD AUTO: 140 K/UL — LOW (ref 150–400)
POTASSIUM SERPL-MCNC: 4 MMOL/L — SIGNIFICANT CHANGE UP (ref 3.5–5.3)
POTASSIUM SERPL-MCNC: 4.3 MMOL/L — SIGNIFICANT CHANGE UP (ref 3.5–5.3)
POTASSIUM SERPL-SCNC: 4 MMOL/L — SIGNIFICANT CHANGE UP (ref 3.5–5.3)
POTASSIUM SERPL-SCNC: 4.3 MMOL/L — SIGNIFICANT CHANGE UP (ref 3.5–5.3)
PROT SERPL-MCNC: 6 G/DL — SIGNIFICANT CHANGE UP (ref 6–8.3)
PROT SERPL-MCNC: 6.1 G/DL — SIGNIFICANT CHANGE UP (ref 6–8.3)
PROTHROM AB SERPL-ACNC: 13.6 SEC — SIGNIFICANT CHANGE UP (ref 10.6–13.6)
PROTHROM AB SERPL-ACNC: 14.1 SEC — HIGH (ref 10.6–13.6)
RBC # BLD: 2.89 M/UL — LOW (ref 4.2–5.8)
RBC # BLD: 2.93 M/UL — LOW (ref 4.2–5.8)
RBC # FLD: 15.4 % — HIGH (ref 10.3–14.5)
RBC # FLD: 15.4 % — HIGH (ref 10.3–14.5)
SODIUM SERPL-SCNC: 137 MMOL/L — SIGNIFICANT CHANGE UP (ref 135–145)
SODIUM SERPL-SCNC: 138 MMOL/L — SIGNIFICANT CHANGE UP (ref 135–145)
WBC # BLD: 14.24 K/UL — HIGH (ref 3.8–10.5)
WBC # BLD: 14.4 K/UL — HIGH (ref 3.8–10.5)
WBC # FLD AUTO: 14.24 K/UL — HIGH (ref 3.8–10.5)
WBC # FLD AUTO: 14.4 K/UL — HIGH (ref 3.8–10.5)

## 2021-07-09 PROCEDURE — 99291 CRITICAL CARE FIRST HOUR: CPT

## 2021-07-09 PROCEDURE — 71045 X-RAY EXAM CHEST 1 VIEW: CPT | Mod: 26

## 2021-07-09 RX ORDER — SODIUM,POTASSIUM PHOSPHATES 278-250MG
1 POWDER IN PACKET (EA) ORAL ONCE
Refills: 0 | Status: COMPLETED | OUTPATIENT
Start: 2021-07-09 | End: 2021-07-09

## 2021-07-09 RX ORDER — POTASSIUM CHLORIDE 20 MEQ
20 PACKET (EA) ORAL ONCE
Refills: 0 | Status: COMPLETED | OUTPATIENT
Start: 2021-07-09 | End: 2021-07-09

## 2021-07-09 RX ORDER — ESCITALOPRAM OXALATE 10 MG/1
10 TABLET, FILM COATED ORAL DAILY
Refills: 0 | Status: DISCONTINUED | OUTPATIENT
Start: 2021-07-09 | End: 2021-07-11

## 2021-07-09 RX ORDER — POTASSIUM PHOSPHATE, MONOBASIC POTASSIUM PHOSPHATE, DIBASIC 236; 224 MG/ML; MG/ML
15 INJECTION, SOLUTION INTRAVENOUS ONCE
Refills: 0 | Status: COMPLETED | OUTPATIENT
Start: 2021-07-09 | End: 2021-07-09

## 2021-07-09 RX ORDER — SODIUM CHLORIDE 9 MG/ML
3 INJECTION INTRAMUSCULAR; INTRAVENOUS; SUBCUTANEOUS EVERY 8 HOURS
Refills: 0 | Status: DISCONTINUED | OUTPATIENT
Start: 2021-07-09 | End: 2021-07-11

## 2021-07-09 RX ORDER — FUROSEMIDE 40 MG
20 TABLET ORAL ONCE
Refills: 0 | Status: COMPLETED | OUTPATIENT
Start: 2021-07-09 | End: 2021-07-09

## 2021-07-09 RX ADMIN — CARBIDOPA AND LEVODOPA 1 TABLET(S): 25; 100 TABLET ORAL at 05:17

## 2021-07-09 RX ADMIN — CARBIDOPA AND LEVODOPA 1 TABLET(S): 25; 100 TABLET ORAL at 14:29

## 2021-07-09 RX ADMIN — HEPARIN SODIUM 5000 UNIT(S): 5000 INJECTION INTRAVENOUS; SUBCUTANEOUS at 22:23

## 2021-07-09 RX ADMIN — Medication 25 MILLIGRAM(S): at 18:29

## 2021-07-09 RX ADMIN — POTASSIUM PHOSPHATE, MONOBASIC POTASSIUM PHOSPHATE, DIBASIC 62.5 MILLIMOLE(S): 236; 224 INJECTION, SOLUTION INTRAVENOUS at 06:49

## 2021-07-09 RX ADMIN — Medication 25 MILLIGRAM(S): at 23:05

## 2021-07-09 RX ADMIN — PANTOPRAZOLE SODIUM 40 MILLIGRAM(S): 20 TABLET, DELAYED RELEASE ORAL at 07:50

## 2021-07-09 RX ADMIN — SODIUM CHLORIDE 3 MILLILITER(S): 9 INJECTION INTRAMUSCULAR; INTRAVENOUS; SUBCUTANEOUS at 14:22

## 2021-07-09 RX ADMIN — SODIUM CHLORIDE 3 MILLILITER(S): 9 INJECTION INTRAMUSCULAR; INTRAVENOUS; SUBCUTANEOUS at 22:23

## 2021-07-09 RX ADMIN — GABAPENTIN 300 MILLIGRAM(S): 400 CAPSULE ORAL at 05:14

## 2021-07-09 RX ADMIN — GABAPENTIN 300 MILLIGRAM(S): 400 CAPSULE ORAL at 14:29

## 2021-07-09 RX ADMIN — HEPARIN SODIUM 5000 UNIT(S): 5000 INJECTION INTRAVENOUS; SUBCUTANEOUS at 14:30

## 2021-07-09 RX ADMIN — Medication 20 MILLIGRAM(S): at 11:32

## 2021-07-09 RX ADMIN — Medication 81 MILLIGRAM(S): at 11:32

## 2021-07-09 RX ADMIN — Medication 650 MILLIGRAM(S): at 14:32

## 2021-07-09 RX ADMIN — Medication 1 TABLET(S): at 14:29

## 2021-07-09 RX ADMIN — CARBIDOPA AND LEVODOPA 1 TABLET(S): 25; 100 TABLET ORAL at 22:23

## 2021-07-09 RX ADMIN — CHLORHEXIDINE GLUCONATE 1 APPLICATION(S): 213 SOLUTION TOPICAL at 05:17

## 2021-07-09 RX ADMIN — Medication 20 MILLIEQUIVALENT(S): at 11:32

## 2021-07-09 RX ADMIN — FINASTERIDE 5 MILLIGRAM(S): 5 TABLET, FILM COATED ORAL at 11:32

## 2021-07-09 RX ADMIN — Medication 25 MILLIGRAM(S): at 11:32

## 2021-07-09 RX ADMIN — TAMSULOSIN HYDROCHLORIDE 0.4 MILLIGRAM(S): 0.4 CAPSULE ORAL at 22:23

## 2021-07-09 RX ADMIN — HEPARIN SODIUM 5000 UNIT(S): 5000 INJECTION INTRAVENOUS; SUBCUTANEOUS at 05:14

## 2021-07-09 RX ADMIN — GABAPENTIN 300 MILLIGRAM(S): 400 CAPSULE ORAL at 22:23

## 2021-07-09 RX ADMIN — Medication 25 MILLIGRAM(S): at 05:14

## 2021-07-09 NOTE — CHART NOTE - NSCHARTNOTEFT_GEN_A_CORE
Pt exhibiting intrinsic heart rate and rhythm.  Per Dr. Ty pacing wires removed at bedside.  No acute issues.  Pt tolerated the procedure well.

## 2021-07-09 NOTE — CHART NOTE - NSCHARTNOTEFT_GEN_A_CORE
CT Removal:    Pt seen and examined at bedside.  Case discussed with Dr. Ty    Minimal output from CTs.  No air leak appreciated.  CT removed without incident per Dr. salgado.  Occlusive DSD placed.  CXR no obvious PTX noted.  Pt tolerated procedure well.

## 2021-07-09 NOTE — PROGRESS NOTE ADULT - SUBJECTIVE AND OBJECTIVE BOX
Patient discussed on morning rounds with Dr. Ty    Operation / Date: 7/7/21: Minimally invasive MV repair    SUBJECTIVE ASSESSMENT:  75y Male transferred from [facility], patient states that he is feeling well today, his appetite is increasing and pain is better controlled. Patient denies dizziness, vision changes, chest pain, palpitations, shortness of breath, cough, n/v/d, extremity swelling, calf tenderness.         Vital Signs Last 24 Hrs  T(C): 36.3 (09 Jul 2021 14:00), Max: 37 (09 Jul 2021 01:01)  T(F): 97.3 (09 Jul 2021 14:00), Max: 98.6 (09 Jul 2021 01:01)  HR: 71 (09 Jul 2021 14:00) (68 - 88)  BP: 109/55 (09 Jul 2021 14:00) (109/55 - 164/75)  BP(mean): 75 (09 Jul 2021 14:00) (75 - 109)  RR: 20 (09 Jul 2021 14:00) (15 - 20)  SpO2: 94% (09 Jul 2021 14:00) (92% - 100%)  I&O's Detail    08 Jul 2021 07:01  -  09 Jul 2021 07:00  --------------------------------------------------------  IN:    Albumin 5%  - 250 mL: 500 mL    IV PiggyBack: 125 mL    Milrinone: 43.2 mL    Milrinone: 14.4 mL    Oral Fluid: 680 mL    PRBCs (Packed Red Blood Cells): 350 mL    sodium chloride 0.9%: 230 mL  Total IN: 1942.6 mL    OUT:    Bulb (mL): 25 mL    Chest Tube (mL): 40 mL    Indwelling Catheter - Urethral (mL): 1310 mL    Norepinephrine: 0 mL  Total OUT: 1375 mL    Total NET: 567.6 mL      09 Jul 2021 07:01  -  09 Jul 2021 16:29  --------------------------------------------------------  IN:    IV PiggyBack: 62.5 mL    sodium chloride 0.9%: 50 mL  Total IN: 112.5 mL    OUT:    Bulb (mL): 0 mL    Indwelling Catheter - Urethral (mL): 175 mL    Voided (mL): 300 mL  Total OUT: 475 mL    Total NET: -362.5 mL      CHEST TUBE:  No  CHAN DRAIN:  No.  EPICARDIAL WIRES: No. removed 7/9  TIE DOWNS: Yes  WRIGHT: No.    PHYSICAL EXAM:  Neuro: A+O x 3, non-focal, speech clear and intact  HEENT: PERRL, EOMI, oral mucosa pink and moist  Neck: supple, no JVD  CV: regular rate, regular rhythm, +S1S2, no murmurs or rub  Pulm/chest: lung sounds CTA and equal bilaterally, no accessory muscle use noted  Abd: soft, NT, ND, +BS, mild lower tenderness to palpation, previous mid abdominal healed scar  Ext: BOX x 4, no C/C/E  Skin: warm, well perfused, no rashes   Incisions: clean, dry dressing in place.     LABS:                        9.1    14.40 )-----------( 140      ( 09 Jul 2021 10:47 )             27.8       PT/INR - ( 09 Jul 2021 10:47 )   PT: 13.6 sec;   INR: 1.14          PTT - ( 09 Jul 2021 10:47 )  PTT:32.0 sec    07-09    138  |  105  |  24<H>  ----------------------------<  133<H>  4.3   |  23  |  0.77    Ca    9.4      09 Jul 2021 10:47  Phos  1.9     07-09  Mg     2.1     07-09    TPro  6.1  /  Alb  3.8  /  TBili  0.8  /  DBili  x   /  AST  27  /  ALT  10  /  AlkPhos  35<L>  07-09        MEDICATIONS  (STANDING):  aspirin enteric coated 81 milliGRAM(s) Oral daily  carbidopa/levodopa  25/100 1 Tablet(s) Oral three times a day  chlorhexidine 2% Cloths 1 Application(s) Topical <User Schedule>  dextrose 50% Injectable 25 Gram(s) IV Push once  dextrose 50% Injectable 12.5 Gram(s) IV Push once  escitalopram 10 milliGRAM(s) Oral daily  finasteride 5 milliGRAM(s) Oral daily  gabapentin 300 milliGRAM(s) Oral three times a day  heparin   Injectable 5000 Unit(s) SubCutaneous every 8 hours  insulin lispro (ADMELOG) corrective regimen sliding scale   SubCutaneous Before meals and at bedtime  metoprolol tartrate 25 milliGRAM(s) Oral every 6 hours  pantoprazole    Tablet 40 milliGRAM(s) Oral before breakfast  sodium chloride 0.9% lock flush 3 milliLiter(s) IV Push every 8 hours  tamsulosin 0.4 milliGRAM(s) Oral at bedtime    MEDICATIONS  (PRN):  acetaminophen   Tablet .. 650 milliGRAM(s) Oral every 6 hours PRN Mild Pain (1 - 3)  hyoscyamine SL 0.125 milliGRAM(s) SubLingual two times a day PRN bowel cramping  melatonin 3 milliGRAM(s) Oral at bedtime PRN Insomnia  oxycodone    5 mG/acetaminophen 325 mG 1 Tablet(s) Oral every 4 hours PRN Moderate Pain (4 - 6)

## 2021-07-10 ENCOUNTER — TRANSCRIPTION ENCOUNTER (OUTPATIENT)
Age: 76
End: 2021-07-10

## 2021-07-10 LAB
A1C WITH ESTIMATED AVERAGE GLUCOSE RESULT: 5.2 % — SIGNIFICANT CHANGE UP (ref 4–5.6)
ANION GAP SERPL CALC-SCNC: 8 MMOL/L — SIGNIFICANT CHANGE UP (ref 5–17)
BUN SERPL-MCNC: 17 MG/DL — SIGNIFICANT CHANGE UP (ref 7–23)
CALCIUM SERPL-MCNC: 9.9 MG/DL — SIGNIFICANT CHANGE UP (ref 8.4–10.5)
CHLORIDE SERPL-SCNC: 106 MMOL/L — SIGNIFICANT CHANGE UP (ref 96–108)
CO2 SERPL-SCNC: 26 MMOL/L — SIGNIFICANT CHANGE UP (ref 22–31)
CREAT SERPL-MCNC: 0.81 MG/DL — SIGNIFICANT CHANGE UP (ref 0.5–1.3)
ESTIMATED AVERAGE GLUCOSE: 103 MG/DL — SIGNIFICANT CHANGE UP (ref 68–114)
GLUCOSE SERPL-MCNC: 113 MG/DL — HIGH (ref 70–99)
HCT VFR BLD CALC: 31.5 % — LOW (ref 39–50)
HGB BLD-MCNC: 10 G/DL — LOW (ref 13–17)
MAGNESIUM SERPL-MCNC: 2 MG/DL — SIGNIFICANT CHANGE UP (ref 1.6–2.6)
MCHC RBC-ENTMCNC: 31.4 PG — SIGNIFICANT CHANGE UP (ref 27–34)
MCHC RBC-ENTMCNC: 31.7 GM/DL — LOW (ref 32–36)
MCV RBC AUTO: 99.1 FL — SIGNIFICANT CHANGE UP (ref 80–100)
NRBC # BLD: 0 /100 WBCS — SIGNIFICANT CHANGE UP (ref 0–0)
PLATELET # BLD AUTO: 162 K/UL — SIGNIFICANT CHANGE UP (ref 150–400)
POTASSIUM SERPL-MCNC: 4 MMOL/L — SIGNIFICANT CHANGE UP (ref 3.5–5.3)
POTASSIUM SERPL-SCNC: 4 MMOL/L — SIGNIFICANT CHANGE UP (ref 3.5–5.3)
RBC # BLD: 3.18 M/UL — LOW (ref 4.2–5.8)
RBC # FLD: 14.7 % — HIGH (ref 10.3–14.5)
SODIUM SERPL-SCNC: 140 MMOL/L — SIGNIFICANT CHANGE UP (ref 135–145)
TSH SERPL-MCNC: 2.21 UIU/ML — SIGNIFICANT CHANGE UP (ref 0.27–4.2)
WBC # BLD: 11.91 K/UL — HIGH (ref 3.8–10.5)
WBC # FLD AUTO: 11.91 K/UL — HIGH (ref 3.8–10.5)

## 2021-07-10 PROCEDURE — 71046 X-RAY EXAM CHEST 2 VIEWS: CPT | Mod: 26

## 2021-07-10 PROCEDURE — 93306 TTE W/DOPPLER COMPLETE: CPT | Mod: 26

## 2021-07-10 RX ORDER — SENNA PLUS 8.6 MG/1
2 TABLET ORAL AT BEDTIME
Refills: 0 | Status: DISCONTINUED | OUTPATIENT
Start: 2021-07-10 | End: 2021-07-11

## 2021-07-10 RX ORDER — POLYETHYLENE GLYCOL 3350 17 G/17G
17 POWDER, FOR SOLUTION ORAL DAILY
Refills: 0 | Status: DISCONTINUED | OUTPATIENT
Start: 2021-07-10 | End: 2021-07-11

## 2021-07-10 RX ORDER — POTASSIUM CHLORIDE 20 MEQ
10 PACKET (EA) ORAL DAILY
Refills: 0 | Status: DISCONTINUED | OUTPATIENT
Start: 2021-07-10 | End: 2021-07-11

## 2021-07-10 RX ORDER — METOPROLOL TARTRATE 50 MG
37.5 TABLET ORAL EVERY 8 HOURS
Refills: 0 | Status: DISCONTINUED | OUTPATIENT
Start: 2021-07-10 | End: 2021-07-11

## 2021-07-10 RX ORDER — FUROSEMIDE 40 MG
20 TABLET ORAL DAILY
Refills: 0 | Status: DISCONTINUED | OUTPATIENT
Start: 2021-07-10 | End: 2021-07-11

## 2021-07-10 RX ADMIN — HEPARIN SODIUM 5000 UNIT(S): 5000 INJECTION INTRAVENOUS; SUBCUTANEOUS at 21:42

## 2021-07-10 RX ADMIN — SENNA PLUS 2 TABLET(S): 8.6 TABLET ORAL at 21:42

## 2021-07-10 RX ADMIN — CARBIDOPA AND LEVODOPA 1 TABLET(S): 25; 100 TABLET ORAL at 06:22

## 2021-07-10 RX ADMIN — ESCITALOPRAM OXALATE 10 MILLIGRAM(S): 10 TABLET, FILM COATED ORAL at 11:41

## 2021-07-10 RX ADMIN — FINASTERIDE 5 MILLIGRAM(S): 5 TABLET, FILM COATED ORAL at 11:41

## 2021-07-10 RX ADMIN — Medication 10 MILLIEQUIVALENT(S): at 09:04

## 2021-07-10 RX ADMIN — SODIUM CHLORIDE 3 MILLILITER(S): 9 INJECTION INTRAMUSCULAR; INTRAVENOUS; SUBCUTANEOUS at 06:22

## 2021-07-10 RX ADMIN — GABAPENTIN 300 MILLIGRAM(S): 400 CAPSULE ORAL at 21:42

## 2021-07-10 RX ADMIN — Medication 37.5 MILLIGRAM(S): at 14:01

## 2021-07-10 RX ADMIN — GABAPENTIN 300 MILLIGRAM(S): 400 CAPSULE ORAL at 06:22

## 2021-07-10 RX ADMIN — SODIUM CHLORIDE 3 MILLILITER(S): 9 INJECTION INTRAMUSCULAR; INTRAVENOUS; SUBCUTANEOUS at 14:08

## 2021-07-10 RX ADMIN — PANTOPRAZOLE SODIUM 40 MILLIGRAM(S): 20 TABLET, DELAYED RELEASE ORAL at 06:22

## 2021-07-10 RX ADMIN — Medication 20 MILLIGRAM(S): at 09:03

## 2021-07-10 RX ADMIN — HEPARIN SODIUM 5000 UNIT(S): 5000 INJECTION INTRAVENOUS; SUBCUTANEOUS at 06:22

## 2021-07-10 RX ADMIN — Medication 25 MILLIGRAM(S): at 06:22

## 2021-07-10 RX ADMIN — Medication 81 MILLIGRAM(S): at 11:41

## 2021-07-10 RX ADMIN — Medication 37.5 MILLIGRAM(S): at 21:42

## 2021-07-10 RX ADMIN — GABAPENTIN 300 MILLIGRAM(S): 400 CAPSULE ORAL at 14:02

## 2021-07-10 RX ADMIN — CARBIDOPA AND LEVODOPA 1 TABLET(S): 25; 100 TABLET ORAL at 14:01

## 2021-07-10 RX ADMIN — HEPARIN SODIUM 5000 UNIT(S): 5000 INJECTION INTRAVENOUS; SUBCUTANEOUS at 14:02

## 2021-07-10 RX ADMIN — SODIUM CHLORIDE 3 MILLILITER(S): 9 INJECTION INTRAMUSCULAR; INTRAVENOUS; SUBCUTANEOUS at 21:14

## 2021-07-10 RX ADMIN — POLYETHYLENE GLYCOL 3350 17 GRAM(S): 17 POWDER, FOR SOLUTION ORAL at 11:41

## 2021-07-10 RX ADMIN — TAMSULOSIN HYDROCHLORIDE 0.4 MILLIGRAM(S): 0.4 CAPSULE ORAL at 21:42

## 2021-07-10 RX ADMIN — CARBIDOPA AND LEVODOPA 1 TABLET(S): 25; 100 TABLET ORAL at 21:42

## 2021-07-10 NOTE — PROGRESS NOTE ADULT - SUBJECTIVE AND OBJECTIVE BOX
CTICU  CRITICAL  CARE  attending     Hand off received 					   Pertinent clinical, laboratory, radiographic, hemodynamic, echocardiographic, respiratory data, microbiologic data and chart were reviewed and analyzed frequently throughout the course of the day and night  Patient seen and examined with CTS/ SH attending at bedside  Pt is a 75y , Male, HEALTH ISSUES - PROBLEM Dx:      , FAMILY HISTORY:  Known health problems: none (Father, Mother)    PAST MEDICAL & SURGICAL HISTORY:  GERD (gastroesophageal reflux disease)    History of mitral valve prolapse    Parkinson disease    Ulcerative colitis    BPH associated with nocturia    SBO (small bowel obstruction)    H/O inguinal hernia repair    S/P small bowel resection    S/P total knee replacement, right      Patient is a 75y old  Male who presents with a chief complaint of Mitral Valve Regurgitation (10 Jul 2021 10:25)      14 system review limited by mentation and multiorgan morbidity     Vital signs, hemodynamic and respiratory parameters were reviewed from the bedside nursing flowsheet.  ICU Vital Signs Last 24 Hrs  T(C): 36.6 (10 Jul 2021 09:05), Max: 36.6 (10 Jul 2021 00:05)  T(F): 97.9 (10 Jul 2021 09:05), Max: 97.9 (10 Jul 2021 09:05)  HR: 80 (10 Jul 2021 11:45) (71 - 80)  BP: 133/60 (10 Jul 2021 11:45) (109/55 - 171/70)  BP(mean): 86 (10 Jul 2021 11:45) (75 - 101)  ABP: --  ABP(mean): --  RR: 21 (10 Jul 2021 11:45) (17 - 29)  SpO2: 98% (10 Jul 2021 11:45) (94% - 98%)    Adult Advanced Hemodynamics Last 24 Hrs  CVP(mm Hg): --  CVP(cm H2O): --  CO: --  CI: --  PA: --  PA(mean): --  PCWP: --  SVR: --  SVRI: --  PVR: --  PVRI: --, ABG - ( 09 Jul 2021 10:37 )  pH, Arterial: 7.45  pH, Blood: x     /  pCO2: 36    /  pO2: 68    / HCO3: 25    / Base Excess: 1.3   /  SaO2: 94.4                Intake and output was reviewed and the fluid balance was calculated  Daily     Daily   I&O's Summary    09 Jul 2021 07:01  -  10 Jul 2021 07:00  --------------------------------------------------------  IN: 662.5 mL / OUT: 1475 mL / NET: -812.5 mL    10 Jul 2021 07:01  -  10 Jul 2021 13:24  --------------------------------------------------------  IN: 100 mL / OUT: 275 mL / NET: -175 mL        All lines and drain sites were assessed  Glycemic trend was reviewedCAPILLARY BLOOD GLUCOSE      POCT Blood Glucose.: 115 mg/dL (09 Jul 2021 16:29)    No acute change in focality  Auscultation of the chest reveals equal bs  Abdomen is soft  Extremities are warm and well perfused  Wounds appear clean and unremarkable  Antibiotics are periop    labs  CBC Full  -  ( 10 Jul 2021 07:25 )  WBC Count : 11.91 K/uL  RBC Count : 3.18 M/uL  Hemoglobin : 10.0 g/dL  Hematocrit : 31.5 %  Platelet Count - Automated : 162 K/uL  Mean Cell Volume : 99.1 fl  Mean Cell Hemoglobin : 31.4 pg  Mean Cell Hemoglobin Concentration : 31.7 gm/dL  Auto Neutrophil # : x  Auto Lymphocyte # : x  Auto Monocyte # : x  Auto Eosinophil # : x  Auto Basophil # : x  Auto Neutrophil % : x  Auto Lymphocyte % : x  Auto Monocyte % : x  Auto Eosinophil % : x  Auto Basophil % : x    07-10    140  |  106  |  17  ----------------------------<  113<H>  4.0   |  26  |  0.81    Ca    9.9      10 Jul 2021 07:25  Phos  1.9     07-09  Mg     2.0     07-10    TPro  6.1  /  Alb  3.8  /  TBili  0.8  /  DBili  x   /  AST  27  /  ALT  10  /  AlkPhos  35<L>  07-09    PT/INR - ( 09 Jul 2021 10:47 )   PT: 13.6 sec;   INR: 1.14          PTT - ( 09 Jul 2021 10:47 )  PTT:32.0 sec  The current medications were reviewed   MEDICATIONS  (STANDING):  aspirin enteric coated 81 milliGRAM(s) Oral daily  carbidopa/levodopa  25/100 1 Tablet(s) Oral three times a day  escitalopram 10 milliGRAM(s) Oral daily  finasteride 5 milliGRAM(s) Oral daily  furosemide    Tablet 20 milliGRAM(s) Oral daily  gabapentin 300 milliGRAM(s) Oral three times a day  heparin   Injectable 5000 Unit(s) SubCutaneous every 8 hours  metoprolol tartrate 37.5 milliGRAM(s) Oral every 8 hours  pantoprazole    Tablet 40 milliGRAM(s) Oral before breakfast  polyethylene glycol 3350 17 Gram(s) Oral daily  potassium chloride    Tablet ER 10 milliEquivalent(s) Oral daily  senna 2 Tablet(s) Oral at bedtime  sodium chloride 0.9% lock flush 3 milliLiter(s) IV Push every 8 hours  tamsulosin 0.4 milliGRAM(s) Oral at bedtime    MEDICATIONS  (PRN):  acetaminophen   Tablet .. 650 milliGRAM(s) Oral every 6 hours PRN Mild Pain (1 - 3)  bisacodyl 5 milliGRAM(s) Oral every 12 hours PRN Constipation  hyoscyamine SL 0.125 milliGRAM(s) SubLingual two times a day PRN bowel cramping  melatonin 3 milliGRAM(s) Oral at bedtime PRN Insomnia  oxycodone    5 mG/acetaminophen 325 mG 1 Tablet(s) Oral every 4 hours PRN Moderate Pain (4 - 6)       PROBLEM LIST/ ASSESSMENT:  HEALTH ISSUES - PROBLEM Dx:      ,   Patient is a 75y old  Male who presents with a chief complaint of Mitral Valve Regurgitation (10 Jul 2021 10:25)     s/p cardiac surgery                My plan includes :  close hemodynamic, ventilatory and drain monitoring and management per post op routine    Monitor for arrhythmias and monitor parameters for organ perfusion  beta blockade not administered due to hemodynamic instability and bradycardia  monitor neurologic status  Head of the bed should remain elevated to 45 deg .   chest PT and IS will be encouraged  monitor adequacy of oxygenation and ventilation and attempt to wean oxygen  antibiotic regimen will be tailored to the clinical, laboratory and microbiologic data  Nutritional goals will be met using po eventually , ensure adequate caloric intake and montior the same  Stress ulcer and VTE prophylaxis will be achieved    Glycemic control is satisfactory  Electrolytes have been repleted as necessary and wound care has been carried out. Pain control has been achieved.   agressive physical therapy and early mobility and ambulation goals will be met   The family was updated about the course and plan  CRITICAL CARE TIME personally provided by me  in evaluation and management, reassessments, review and interpretation of labs and x-rays, ventilator and hemodynamic management, formulating a plan and coordinating care: ___90____ MIN.  Time does not include procedural time. Time spent was non routine post-operarive caRE and included multiple and repeated evaluations at the bedside  CTICU ATTENDING     					    Oscar Buckley MD

## 2021-07-10 NOTE — PROGRESS NOTE ADULT - SUBJECTIVE AND OBJECTIVE BOX
Patient discussed on morning rounds with Dr. Ty     Operation / Date: 7/7/21 -- minimally invasive MV repair, EF 60%     SUBJECTIVE ASSESSMENT:  Pt is feeling well today, looking forward to going home tomorrow. Breathing feels good. No BM yet since surgery but is passing gas frequently. Eating/drinking well but frustrated  is delayed today. Denies any CP, palpitations, SOB, wheezing, abd pain, n/v/d/c, fevers or chills.    Vital Signs Last 24 Hrs  T(C): 36.6 (10 Jul 2021 09:05), Max: 36.6 (10 Jul 2021 00:05)  T(F): 97.9 (10 Jul 2021 09:05), Max: 97.9 (10 Jul 2021 09:05)  HR: 71 (10 Jul 2021 08:34) (71 - 80)  BP: 115/58 (10 Jul 2021 08:34) (109/55 - 171/70)  BP(mean): 83 (10 Jul 2021 08:34) (75 - 101)  RR: 29 (10 Jul 2021 08:34) (17 - 29)  SpO2: 96% (10 Jul 2021 08:34) (94% - 98%)  I&O's Detail    09 Jul 2021 07:01  -  10 Jul 2021 07:00  --------------------------------------------------------  IN:    IV PiggyBack: 62.5 mL    Oral Fluid: 550 mL    sodium chloride 0.9%: 50 mL  Total IN: 662.5 mL    OUT:    Bulb (mL): 0 mL    Indwelling Catheter - Urethral (mL): 175 mL    Voided (mL): 1300 mL  Total OUT: 1475 mL    Total NET: -812.5 mL    CHEST TUBE:  no  CHAN DRAIN:  no  EPICARDIAL WIRES: no  TIE DOWNS: yes (x2 right chest)   WRIGHT: no    PHYSICAL EXAM:  General: well appearing sitting in chair in NAD   Neurological: AOx3. Motor skills grossly intact  Cardiovascular: Normal S1/S2. Regular rate/rhythm. No murmurs  Respiratory: Lungs CTA bilaterally. No wheezing or rales  Gastrointestinal: +BS in all 4 quadrants. Non-distended. Soft. Non-tender  Extremities: Strength 5/5 b/l upper/lower extremities. Sensation grossly intact upper/lower extremities. Trace LE edema b/l. No calf tenderness.  Vascular: Radial 2+bilaterally, DP 2+ b/l  Incision Sites: right inframammary thoracotomy incision healing well no erythema, purulence or ecchymosis. Right groin access incision healing well no erythema, purulence or ecchymosis. No bruits. No tenderness.       LABS:                        10.0   11.91 )-----------( 162      ( 10 Jul 2021 07:25 )             31.5     COUMADIN:  no    PT/INR - ( 09 Jul 2021 10:47 )   PT: 13.6 sec;   INR: 1.14     PTT - ( 09 Jul 2021 10:47 )  PTT:32.0 sec    07-10    140  |  106  |  17  ----------------------------<  113<H>  4.0   |  26  |  0.81    Ca    9.9      10 Jul 2021 07:25  Phos  1.9     07-09  Mg     2.0     07-10    TPro  6.1  /  Alb  3.8  /  TBili  0.8  /  DBili  x   /  AST  27  /  ALT  10  /  AlkPhos  35<L>  07-09      MEDICATIONS  (STANDING):  aspirin enteric coated 81 milliGRAM(s) Oral daily  carbidopa/levodopa  25/100 1 Tablet(s) Oral three times a day  escitalopram 10 milliGRAM(s) Oral daily  finasteride 5 milliGRAM(s) Oral daily  furosemide    Tablet 20 milliGRAM(s) Oral daily  gabapentin 300 milliGRAM(s) Oral three times a day  heparin   Injectable 5000 Unit(s) SubCutaneous every 8 hours  metoprolol tartrate 37.5 milliGRAM(s) Oral every 8 hours  pantoprazole    Tablet 40 milliGRAM(s) Oral before breakfast  potassium chloride    Tablet ER 10 milliEquivalent(s) Oral daily  sodium chloride 0.9% lock flush 3 milliLiter(s) IV Push every 8 hours  tamsulosin 0.4 milliGRAM(s) Oral at bedtime    MEDICATIONS  (PRN):  acetaminophen   Tablet .. 650 milliGRAM(s) Oral every 6 hours PRN Mild Pain (1 - 3)  hyoscyamine SL 0.125 milliGRAM(s) SubLingual two times a day PRN bowel cramping  melatonin 3 milliGRAM(s) Oral at bedtime PRN Insomnia  oxycodone    5 mG/acetaminophen 325 mG 1 Tablet(s) Oral every 4 hours PRN Moderate Pain (4 - 6)      RADIOLOGY & ADDITIONAL TESTS:  < from: Xray Chest 1 View-PORTABLE IMMEDIATE (Xray Chest 1 View-PORTABLE IMMEDIATE .) (07.09.21 @ 11:31) >  Findings/  impression: Stable heart size, thoracic aortic calcification, heart valve surgery. Right basilar clips. Bilateral opacities/pleural effusions, decreased. Stable bony structures, levoscoliosis. Subcutaneous emphysema, stable.  < end of copied text >

## 2021-07-11 ENCOUNTER — TRANSCRIPTION ENCOUNTER (OUTPATIENT)
Age: 76
End: 2021-07-11

## 2021-07-11 VITALS
HEART RATE: 75 BPM | SYSTOLIC BLOOD PRESSURE: 101 MMHG | OXYGEN SATURATION: 96 % | RESPIRATION RATE: 12 BRPM | DIASTOLIC BLOOD PRESSURE: 53 MMHG

## 2021-07-11 LAB
ANION GAP SERPL CALC-SCNC: 9 MMOL/L — SIGNIFICANT CHANGE UP (ref 5–17)
BUN SERPL-MCNC: 16 MG/DL — SIGNIFICANT CHANGE UP (ref 7–23)
CALCIUM SERPL-MCNC: 9.8 MG/DL — SIGNIFICANT CHANGE UP (ref 8.4–10.5)
CHLORIDE SERPL-SCNC: 105 MMOL/L — SIGNIFICANT CHANGE UP (ref 96–108)
CO2 SERPL-SCNC: 26 MMOL/L — SIGNIFICANT CHANGE UP (ref 22–31)
CREAT SERPL-MCNC: 0.79 MG/DL — SIGNIFICANT CHANGE UP (ref 0.5–1.3)
GLUCOSE SERPL-MCNC: 108 MG/DL — HIGH (ref 70–99)
HCT VFR BLD CALC: 31 % — LOW (ref 39–50)
HGB BLD-MCNC: 10 G/DL — LOW (ref 13–17)
MAGNESIUM SERPL-MCNC: 2 MG/DL — SIGNIFICANT CHANGE UP (ref 1.6–2.6)
MCHC RBC-ENTMCNC: 31.6 PG — SIGNIFICANT CHANGE UP (ref 27–34)
MCHC RBC-ENTMCNC: 32.3 GM/DL — SIGNIFICANT CHANGE UP (ref 32–36)
MCV RBC AUTO: 98.1 FL — SIGNIFICANT CHANGE UP (ref 80–100)
NRBC # BLD: 0 /100 WBCS — SIGNIFICANT CHANGE UP (ref 0–0)
PLATELET # BLD AUTO: 193 K/UL — SIGNIFICANT CHANGE UP (ref 150–400)
POTASSIUM SERPL-MCNC: 4.2 MMOL/L — SIGNIFICANT CHANGE UP (ref 3.5–5.3)
POTASSIUM SERPL-SCNC: 4.2 MMOL/L — SIGNIFICANT CHANGE UP (ref 3.5–5.3)
RBC # BLD: 3.16 M/UL — LOW (ref 4.2–5.8)
RBC # FLD: 14.3 % — SIGNIFICANT CHANGE UP (ref 10.3–14.5)
SODIUM SERPL-SCNC: 140 MMOL/L — SIGNIFICANT CHANGE UP (ref 135–145)
WBC # BLD: 9.09 K/UL — SIGNIFICANT CHANGE UP (ref 3.8–10.5)
WBC # FLD AUTO: 9.09 K/UL — SIGNIFICANT CHANGE UP (ref 3.8–10.5)

## 2021-07-11 PROCEDURE — 82803 BLOOD GASES ANY COMBINATION: CPT

## 2021-07-11 PROCEDURE — 86900 BLOOD TYPING SEROLOGIC ABO: CPT

## 2021-07-11 PROCEDURE — 83036 HEMOGLOBIN GLYCOSYLATED A1C: CPT

## 2021-07-11 PROCEDURE — 80053 COMPREHEN METABOLIC PANEL: CPT

## 2021-07-11 PROCEDURE — 84132 ASSAY OF SERUM POTASSIUM: CPT

## 2021-07-11 PROCEDURE — 82962 GLUCOSE BLOOD TEST: CPT

## 2021-07-11 PROCEDURE — 84443 ASSAY THYROID STIM HORMONE: CPT

## 2021-07-11 PROCEDURE — 86901 BLOOD TYPING SEROLOGIC RH(D): CPT

## 2021-07-11 PROCEDURE — 86923 COMPATIBILITY TEST ELECTRIC: CPT

## 2021-07-11 PROCEDURE — P9016: CPT

## 2021-07-11 PROCEDURE — 93306 TTE W/DOPPLER COMPLETE: CPT

## 2021-07-11 PROCEDURE — 88305 TISSUE EXAM BY PATHOLOGIST: CPT

## 2021-07-11 PROCEDURE — 97161 PT EVAL LOW COMPLEX 20 MIN: CPT

## 2021-07-11 PROCEDURE — P9045: CPT

## 2021-07-11 PROCEDURE — 36415 COLL VENOUS BLD VENIPUNCTURE: CPT

## 2021-07-11 PROCEDURE — 86891 AUTOLOGOUS BLOOD OP SALVAGE: CPT

## 2021-07-11 PROCEDURE — 85027 COMPLETE CBC AUTOMATED: CPT

## 2021-07-11 PROCEDURE — 84100 ASSAY OF PHOSPHORUS: CPT

## 2021-07-11 PROCEDURE — 94002 VENT MGMT INPAT INIT DAY: CPT

## 2021-07-11 PROCEDURE — 85730 THROMBOPLASTIN TIME PARTIAL: CPT

## 2021-07-11 PROCEDURE — 71046 X-RAY EXAM CHEST 2 VIEWS: CPT

## 2021-07-11 PROCEDURE — 71045 X-RAY EXAM CHEST 1 VIEW: CPT

## 2021-07-11 PROCEDURE — 85610 PROTHROMBIN TIME: CPT

## 2021-07-11 PROCEDURE — 86850 RBC ANTIBODY SCREEN: CPT

## 2021-07-11 PROCEDURE — 83605 ASSAY OF LACTIC ACID: CPT

## 2021-07-11 PROCEDURE — 84295 ASSAY OF SERUM SODIUM: CPT

## 2021-07-11 PROCEDURE — 82330 ASSAY OF CALCIUM: CPT

## 2021-07-11 PROCEDURE — 80048 BASIC METABOLIC PNL TOTAL CA: CPT

## 2021-07-11 PROCEDURE — 36430 TRANSFUSION BLD/BLD COMPNT: CPT

## 2021-07-11 PROCEDURE — 85025 COMPLETE CBC W/AUTO DIFF WBC: CPT

## 2021-07-11 PROCEDURE — 83735 ASSAY OF MAGNESIUM: CPT

## 2021-07-11 PROCEDURE — 93005 ELECTROCARDIOGRAM TRACING: CPT

## 2021-07-11 PROCEDURE — C1889: CPT

## 2021-07-11 RX ORDER — METOPROLOL TARTRATE 50 MG
50 TABLET ORAL EVERY 12 HOURS
Refills: 0 | Status: DISCONTINUED | OUTPATIENT
Start: 2021-07-11 | End: 2021-07-11

## 2021-07-11 RX ORDER — POTASSIUM CHLORIDE 20 MEQ
1 PACKET (EA) ORAL
Qty: 30 | Refills: 0
Start: 2021-07-11 | End: 2021-08-09

## 2021-07-11 RX ORDER — ACETAMINOPHEN 500 MG
2 TABLET ORAL
Qty: 56 | Refills: 0
Start: 2021-07-11 | End: 2021-07-17

## 2021-07-11 RX ORDER — ASPIRIN/CALCIUM CARB/MAGNESIUM 324 MG
1 TABLET ORAL
Qty: 30 | Refills: 0
Start: 2021-07-11 | End: 2021-08-09

## 2021-07-11 RX ORDER — METOPROLOL TARTRATE 50 MG
1 TABLET ORAL
Qty: 60 | Refills: 0
Start: 2021-07-11 | End: 2021-08-09

## 2021-07-11 RX ORDER — ATORVASTATIN CALCIUM 80 MG/1
1 TABLET, FILM COATED ORAL
Qty: 0 | Refills: 0 | DISCHARGE

## 2021-07-11 RX ORDER — DIPHENOXYLATE HCL/ATROPINE 2.5-.025MG
1 TABLET ORAL
Qty: 0 | Refills: 0 | DISCHARGE

## 2021-07-11 RX ORDER — TADALAFIL 10 MG/1
1 TABLET, FILM COATED ORAL
Qty: 0 | Refills: 0 | DISCHARGE

## 2021-07-11 RX ORDER — FUROSEMIDE 40 MG
1 TABLET ORAL
Qty: 30 | Refills: 0
Start: 2021-07-11 | End: 2021-08-09

## 2021-07-11 RX ADMIN — Medication 37.5 MILLIGRAM(S): at 05:40

## 2021-07-11 RX ADMIN — PANTOPRAZOLE SODIUM 40 MILLIGRAM(S): 20 TABLET, DELAYED RELEASE ORAL at 06:17

## 2021-07-11 RX ADMIN — Medication 81 MILLIGRAM(S): at 09:42

## 2021-07-11 RX ADMIN — POLYETHYLENE GLYCOL 3350 17 GRAM(S): 17 POWDER, FOR SOLUTION ORAL at 09:41

## 2021-07-11 RX ADMIN — HEPARIN SODIUM 5000 UNIT(S): 5000 INJECTION INTRAVENOUS; SUBCUTANEOUS at 05:39

## 2021-07-11 RX ADMIN — ESCITALOPRAM OXALATE 10 MILLIGRAM(S): 10 TABLET, FILM COATED ORAL at 09:41

## 2021-07-11 RX ADMIN — CARBIDOPA AND LEVODOPA 1 TABLET(S): 25; 100 TABLET ORAL at 05:40

## 2021-07-11 RX ADMIN — Medication 20 MILLIGRAM(S): at 05:39

## 2021-07-11 RX ADMIN — Medication 10 MILLIEQUIVALENT(S): at 09:41

## 2021-07-11 RX ADMIN — GABAPENTIN 300 MILLIGRAM(S): 400 CAPSULE ORAL at 05:39

## 2021-07-11 RX ADMIN — FINASTERIDE 5 MILLIGRAM(S): 5 TABLET, FILM COATED ORAL at 09:42

## 2021-07-11 RX ADMIN — SODIUM CHLORIDE 3 MILLILITER(S): 9 INJECTION INTRAMUSCULAR; INTRAVENOUS; SUBCUTANEOUS at 05:40

## 2021-07-11 NOTE — DISCHARGE NOTE PROVIDER - NSDCMRMEDTOKEN_GEN_ALL_CORE_FT
alfuzosin 10 mg oral tablet, extended release: 1 tab(s) orally once a day  Ambien 5 mg oral tablet: 1 tab(s) orally once a day (at bedtime)  atorvastatin 20 mg oral tablet: 1 tab(s) orally once a day  Avodart 0.5 mg oral capsule: 1 cap(s) orally once a day  Calcium 600+D oral tablet: 1 tab(s) orally 2 times a day  Cialis 5 mg oral tablet: 1 tab(s) orally once a day  escitalopram 10 mg oral tablet: 1 tab(s) orally once a day  gabapentin 300 mg oral capsule: 1 tab(s) orally once a day (at bedtime)  hyoscyamine 0.125 mg sublingual tablet: 1 tab(s) sublingual 2 times a day, As Needed  Lipitor 20 mg oral tablet: 1 tab(s) orally once a day  Lomotil 2.5 mg-0.025 mg oral tablet: 1 tab(s) orally every 6 hours, As Needed  Melatonin 3 mg oral tablet: 1 tab(s) orally once a day (at bedtime)  Pepcid 20 mg oral tablet: 1 tab(s) orally 2 times a day  Sinemet 25 mg-100 mg oral tablet: 1 tab(s) orally 3 times a day  TheraLith XR oral tablet: 1 tab(s) orally once a day  Vitamin B-12 1000 mcg oral tablet: 1 tab(s) orally once a day   acetaminophen 325 mg oral tablet: 2 tab(s) orally every 6 hours, As needed, Mild Pain (1 - 3) MDD:8 tabs  alfuzosin 10 mg oral tablet, extended release: 1 tab(s) orally once a day  Ambien 5 mg oral tablet: 1 tab(s) orally once a day (at bedtime)  aspirin 81 mg oral delayed release tablet: 1 tab(s) orally once a day  atorvastatin 20 mg oral tablet: 1 tab(s) orally once a day  Avodart 0.5 mg oral capsule: 1 cap(s) orally once a day  Calcium 600+D oral tablet: 1 tab(s) orally 2 times a day  escitalopram 10 mg oral tablet: 1 tab(s) orally once a day  furosemide 20 mg oral tablet: 1 tab(s) orally once a day for 5 days (7/12 - 7/16)  gabapentin 300 mg oral capsule: 1 tab(s) orally once a day (at bedtime)  hyoscyamine 0.125 mg sublingual tablet: 1 tab(s) sublingual 2 times a day, As Needed  Melatonin 3 mg oral tablet: 1 tab(s) orally once a day (at bedtime)  oxycodone-acetaminophen 5 mg-325 mg oral tablet: 1 tab(s) orally every 4 hours, As needed, Moderate Pain (4 - 6) MDD:6 tabs  Pepcid 20 mg oral tablet: 1 tab(s) orally 2 times a day  potassium chloride 10 mEq oral tablet, extended release: 1 tab(s) orally once a day with Lasix only (furosemide) for 5 days. (7/12 - 7/16)  Sinemet 25 mg-100 mg oral tablet: 1 tab(s) orally 3 times a day  TheraLith XR oral tablet: 1 tab(s) orally once a day  Vitamin B-12 1000 mcg oral tablet: 1 tab(s) orally once a day   acetaminophen 325 mg oral tablet: 2 tab(s) orally every 6 hours, As needed, Mild Pain (1 - 3) MDD:8 tabs  alfuzosin 10 mg oral tablet, extended release: 1 tab(s) orally once a day  Ambien 5 mg oral tablet: 1 tab(s) orally once a day (at bedtime)  aspirin 81 mg oral delayed release tablet: 1 tab(s) orally once a day  atorvastatin 20 mg oral tablet: 1 tab(s) orally once a day  Avodart 0.5 mg oral capsule: 1 cap(s) orally once a day  Calcium 600+D oral tablet: 1 tab(s) orally 2 times a day  escitalopram 10 mg oral tablet: 1 tab(s) orally once a day  furosemide 20 mg oral tablet: 1 tab(s) orally once a day for 5 days (7/12 - 7/16)  gabapentin 300 mg oral capsule: 1 tab(s) orally once a day (at bedtime)  hyoscyamine 0.125 mg sublingual tablet: 1 tab(s) sublingual 2 times a day, As Needed  Melatonin 3 mg oral tablet: 1 tab(s) orally once a day (at bedtime)  metoprolol tartrate 50 mg oral tablet: 1 tab(s) orally every 12 hours  oxycodone-acetaminophen 5 mg-325 mg oral tablet: 1 tab(s) orally every 4 hours, As needed, Moderate Pain (4 - 6) MDD:6 tabs  Pepcid 20 mg oral tablet: 1 tab(s) orally 2 times a day  potassium chloride 10 mEq oral tablet, extended release: 1 tab(s) orally once a day with Lasix only (furosemide) for 5 days. (7/12 - 7/16)  Sinemet 25 mg-100 mg oral tablet: 1 tab(s) orally 3 times a day  TheraLith XR oral tablet: 1 tab(s) orally once a day  Vitamin B-12 1000 mcg oral tablet: 1 tab(s) orally once a day

## 2021-07-11 NOTE — PROGRESS NOTE ADULT - PROVIDER SPECIALTY LIST ADULT
Critical Care
CT Surgery
Critical Care
Critical Care
CT Surgery
Critical Care
CT Surgery

## 2021-07-11 NOTE — DISCHARGE NOTE NURSING/CASE MANAGEMENT/SOCIAL WORK - NSDCFUADDAPPT_GEN_ALL_CORE_FT
-Please attend your discharge appointments.  -Our office will reach out to you regarding your discharge appointments on Monday 7/12. If you do not hear from the office by the end of the day, please call to inquire about your appointments at [phone].

## 2021-07-11 NOTE — DISCHARGE NOTE PROVIDER - NSDCFUADDAPPT_GEN_ALL_CORE_FT
-Please attend your discharge appointments.  -Our office will reach out to you regarding your discharge appointments on Monday 7/12. If you do not hear from the office by the end of the day, please call to inquire about your appointments at [phone].

## 2021-07-11 NOTE — PROGRESS NOTE ADULT - SUBJECTIVE AND OBJECTIVE BOX
Patient discussed on morning rounds with Dr. Chandler and Dr. Galdamez     Operation / Date: 7/7/21 - minimally invasive MV repair, EF 60%     Surgeon: Dr. Ant Ty     Referring Physician: Dr. Warren Vance     SUBJECTIVE ASSESSMENT  Pt is feeling well on the day of discharge and looking forward to going home. No complaints. Eating/drinking well, Moving bowels regularly. Urinating no problems. Ambulates well. Breathing feels well. Pain is well controlled. Denies any CP, palpitations, SOB, wheezing, abd pain, n/v/d/c, fevers or chills.     HOSPITAL COURSE:  75 year old male with PMHx of HLD, Parkison's disease, BPH s/p prostatectomy, osteoarthritis s/p right TKR, ulcerative colitis, SBO s/p laparotomy, small bowel resection, known mitral valve prolapse followed by cardiologist Dr. Warren Lee with serial echocardiograms presented to his cardiologist complaining of worsening YO/SOB and decreased exercise tolerance. Patient had YURY on 6/1/21 revealing severe prolapse of mitral valve with severe regurgitation. He was referred to Dr. Ty and deemed a surgical candidate. He presented to [facility] on 7/7/21 for planned procedure. Patient underwent mitral valve repair via R thoracotomy with Dr. Ty. Procedure was uncomplicated and he was transferred to CT ICU post procedure on primacor in stable condition. He was extubated POD#0. He was weaned off primacor by POD#1, remained stable and transferred to  on POD#2. TTE on POD#3 reveled trace MR with functioning valve. He continued to remain stable, ambualting on room air and as per Dr. Chandler is ready for discharge home on POD#4. Ambulating well, urinating appropriately, regular BMs.     35 minutes was spent with the patient reviewing the discharge material including medications, follow up appointments, recovery, concerning symptoms, and how to contact their health care providers if they have questions     Vital Signs Last 24 Hrs  T(C): 36.8 (11 Jul 2021 05:01), Max: 37.2 (11 Jul 2021 01:10)  T(F): 98.3 (11 Jul 2021 05:01), Max: 98.9 (11 Jul 2021 01:10)  HR: 68 (11 Jul 2021 05:33) (68 - 80)  BP: 164/70 (11 Jul 2021 05:33) (121/55 - 164/70)  BP(mean): 101 (11 Jul 2021 05:33) (79 - 104)  RR: 16 (11 Jul 2021 05:33) (16 - 28)  SpO2: 94% (11 Jul 2021 05:33) (94% - 98%)    EPICARDIAL WIRES REMOVED: n/a  TIE DOWNS REMOVED: Yes    PHYSICAL EXAM:  General: well appearing sitting in chair in NAD   Neurological: AOx3. Motor skills grossly intact  Cardiovascular: Normal S1/S2. Regular rate/rhythm. No murmurs  Respiratory: Lungs CTA bilaterally. No wheezing or rales  Gastrointestinal: +BS in all 4 quadrants. Non-distended. Soft. Non-tender  Extremities: Strength 5/5 b/l upper/lower extremities. Sensation grossly intact upper/lower extremities. No edema. No calf tenderness.  Vascular: Radial 2+bilaterally, DP 2+ b/l  Incision Sites: right inframammary thoracotomy incision healing well no erythema, purulence or ecchymosis. Previous CT sites healing well no issues.       LABS:                        10.0   9.09  )-----------( 193      ( 11 Jul 2021 08:10 )             31.0       COUMADIN:  No. (ASA, Plavix)     PT/INR - ( 09 Jul 2021 10:47 )   PT: 13.6 sec;   INR: 1.14     PTT - ( 09 Jul 2021 10:47 )  PTT:32.0 sec    07-11    140  |  105  |  16  ----------------------------<  108<H>  4.2   |  26  |  0.79    Ca    9.8      11 Jul 2021 08:10  Phos  1.9     07-09  Mg     2.0     07-11    TPro  6.1  /  Alb  3.8  /  TBili  0.8  /  DBili  x   /  AST  27  /  ALT  10  /  AlkPhos  35<L>  07-09      Discharge CXR:  < from: Xray Chest 2 Views PA/Lat (07.10.21 @ 11:24) >  Findings/  impression: Stable cardiomegaly, thoracic aortic calcification, mitral surgery, anterior clips.. Right basilar opacity/pleural effusion stable. Right subcutaneous emphysema, decreased. Left basilar opacity/pleural effusion, decreased. Stable bony structures, scoliosis..  < end of copied text >    Discharge ECHO:  < from: TTE Echo Complete w/o Contrast w/ Doppler (07.10.21 @ 11:44) >  CONCLUSIONS:   1. The right atrium is normal in size. Vague echodensity noted in the right atrium - artifact vs prominent Eustachian valve.   2. S/p mitral valve repair. The mitral valve is mildly thickened. The mean transvalvular gradient is 2.24 mmHg at a heart rate of 77 bpm. There is trace mitral regurgitation.   3. Normal left and right ventricular size and systolic function.   4. No pericardial effusion.   5. No prior transthoracic echo is available for comparison.  < end of copied text >     Patient discussed on morning rounds with Dr. Chandler and Dr. Galdamez     Operation / Date: 7/7/21 - minimally invasive MV repair, EF 60%     Surgeon: Dr. Ant Ty     Referring Physician: Dr. Warren Vance     SUBJECTIVE ASSESSMENT  Pt is feeling well on the day of discharge and looking forward to going home. No complaints. Eating/drinking well, Moving bowels regularly. Urinating no problems. Ambulates well. Breathing feels well. Pain is well controlled. Denies any CP, palpitations, SOB, wheezing, abd pain, n/v/d/c, fevers or chills.     HOSPITAL COURSE:  75 year old male with PMHx of HLD, Parkison's disease, BPH s/p prostatectomy, osteoarthritis s/p right TKR, ulcerative colitis, SBO s/p laparotomy, small bowel resection, known mitral valve prolapse followed by cardiologist Dr. Warren Lee with serial echocardiograms presented to his cardiologist complaining of worsening YO/SOB and decreased exercise tolerance. Patient had YURY on 6/1/21 revealing severe prolapse of mitral valve with severe regurgitation. He was referred to Dr. Ty and deemed a surgical candidate. He presented to [facility] on 7/7/21 for planned procedure. Patient underwent mitral valve repair via R thoracotomy with Dr. Ty. Procedure was uncomplicated and he was transferred to CT ICU post procedure on primacor in stable condition. He was extubated POD#0. He was weaned off primacor by POD#1, remained stable and transferred to  on POD#2. TTE on POD#3 reveled trace MR with functioning valve. He continued to remain stable, ambualting on room air and as per Dr. Chandler is ready for discharge home on POD#4. Ambulating well, urinating appropriately, regular BMs.     35 minutes was spent with the patient reviewing the discharge material including medications, follow up appointments, recovery, concerning symptoms, and how to contact their health care providers if they have questions     Vital Signs Last 24 Hrs  T(C): 36.8 (11 Jul 2021 05:01), Max: 37.2 (11 Jul 2021 01:10)  T(F): 98.3 (11 Jul 2021 05:01), Max: 98.9 (11 Jul 2021 01:10)  HR: 68 (11 Jul 2021 05:33) (68 - 80)  BP: 164/70 (11 Jul 2021 05:33) (121/55 - 164/70)  BP(mean): 101 (11 Jul 2021 05:33) (79 - 104)  RR: 16 (11 Jul 2021 05:33) (16 - 28)  SpO2: 94% (11 Jul 2021 05:33) (94% - 98%)    EPICARDIAL WIRES REMOVED: n/a  TIE DOWNS REMOVED: Yes    PHYSICAL EXAM:  General: well appearing sitting in chair in NAD   Neurological: AOx3. Motor skills grossly intact  Cardiovascular: Normal S1/S2. Regular rate/rhythm. No murmurs  Respiratory: Lungs CTA bilaterally. No wheezing or rales  Gastrointestinal: +BS in all 4 quadrants. Non-distended. Soft. Non-tender  Extremities: Strength 5/5 b/l upper/lower extremities. Sensation grossly intact upper/lower extremities. No edema. No calf tenderness.  Vascular: Radial 2+bilaterally, DP 2+ b/l  Incision Sites: right inframammary thoracotomy incision healing well no erythema, purulence or ecchymosis. Previous CT sites healing well no issues.       LABS:                        10.0   9.09  )-----------( 193      ( 11 Jul 2021 08:10 )             31.0       COUMADIN:  No. (ASA only)     PT/INR - ( 09 Jul 2021 10:47 )   PT: 13.6 sec;   INR: 1.14     PTT - ( 09 Jul 2021 10:47 )  PTT:32.0 sec    07-11    140  |  105  |  16  ----------------------------<  108<H>  4.2   |  26  |  0.79    Ca    9.8      11 Jul 2021 08:10  Phos  1.9     07-09  Mg     2.0     07-11    TPro  6.1  /  Alb  3.8  /  TBili  0.8  /  DBili  x   /  AST  27  /  ALT  10  /  AlkPhos  35<L>  07-09      Discharge CXR:  < from: Xray Chest 2 Views PA/Lat (07.10.21 @ 11:24) >  Findings/  impression: Stable cardiomegaly, thoracic aortic calcification, mitral surgery, anterior clips.. Right basilar opacity/pleural effusion stable. Right subcutaneous emphysema, decreased. Left basilar opacity/pleural effusion, decreased. Stable bony structures, scoliosis..  < end of copied text >    Discharge ECHO:  < from: TTE Echo Complete w/o Contrast w/ Doppler (07.10.21 @ 11:44) >  CONCLUSIONS:   1. The right atrium is normal in size. Vague echodensity noted in the right atrium - artifact vs prominent Eustachian valve.   2. S/p mitral valve repair. The mitral valve is mildly thickened. The mean transvalvular gradient is 2.24 mmHg at a heart rate of 77 bpm. There is trace mitral regurgitation.   3. Normal left and right ventricular size and systolic function.   4. No pericardial effusion.   5. No prior transthoracic echo is available for comparison.  < end of copied text >     Patient discussed on morning rounds with Dr. Chandler and Dr. Galdamez     Operation / Date: 7/7/21 - minimally invasive MV repair, EF 60%     Surgeon: Dr. Ant Ty     Referring Physician: Dr. Warren Vance     SUBJECTIVE ASSESSMENT  Pt is feeling well on the day of discharge and looking forward to going home. No complaints. Eating/drinking well, Moving bowels regularly. Urinating no problems. Ambulates well. Breathing feels well. Pain is well controlled. Denies any CP, palpitations, SOB, wheezing, abd pain, n/v/d/c, fevers or chills.     HOSPITAL COURSE:  75 year old male with PMHx of HLD, Parkison's disease, BPH s/p prostatectomy, osteoarthritis s/p right TKR, ulcerative colitis, SBO s/p laparotomy, small bowel resection, known mitral valve prolapse followed by cardiologist Dr. Warren Lee with serial echocardiograms presented to his cardiologist complaining of worsening YO/SOB and decreased exercise tolerance. Patient had YURY on 6/1/21 revealing severe prolapse of mitral valve with severe regurgitation. He was referred to Dr. Ty and deemed a surgical candidate. He presented to [facility] on 7/7/21 for planned procedure. Patient underwent mitral valve repair via R thoracotomy with Dr. Ty. Procedure was uncomplicated and he was transferred to CT ICU post procedure on primacor in stable condition. He was extubated POD#0. He was weaned off primacor by POD#1, remained stable and transferred to  on POD#2. TTE on POD#3 reveled trace MR with functioning valve. He continued to remain stable, ambualting on room air and as per Dr. Chandler is ready for discharge home on POD#4. Ambulating well, urinating appropriately, regular BMs.     35 minutes was spent with the patient reviewing the discharge material including medications, follow up appointments, recovery, concerning symptoms, and how to contact their health care providers if they have questions     Vital Signs Last 24 Hrs  T(C): 36.8 (11 Jul 2021 05:01), Max: 37.2 (11 Jul 2021 01:10)  T(F): 98.3 (11 Jul 2021 05:01), Max: 98.9 (11 Jul 2021 01:10)  HR: 68 (11 Jul 2021 05:33) (68 - 80)  BP: 164/70 (11 Jul 2021 05:33) (121/55 - 164/70)  BP(mean): 101 (11 Jul 2021 05:33) (79 - 104)  RR: 16 (11 Jul 2021 05:33) (16 - 28)  SpO2: 94% (11 Jul 2021 05:33) (94% - 98%)    EPICARDIAL WIRES REMOVED: n/a  TIE DOWNS REMOVED: Yes    PHYSICAL EXAM:  General: well appearing sitting in chair in NAD   Neurological: AOx3. Motor skills grossly intact  Cardiovascular: Normal S1/S2. Regular rate/rhythm. No murmurs  Respiratory: Lungs CTA bilaterally. No wheezing or rales  Gastrointestinal: +BS in all 4 quadrants. Non-distended. Soft. Non-tender  Extremities: Strength 5/5 b/l upper/lower extremities. Sensation grossly intact upper/lower extremities. No edema. No calf tenderness.  Vascular: Radial 2+bilaterally, DP 2+ b/l  Incision Sites: right inframammary thoracotomy incision healing well no erythema, purulence or ecchymosis. Previous CT sites healing well no issues. Right groin incision healing well no erythema, purulence or ecchymosis.       LABS:                        10.0   9.09  )-----------( 193      ( 11 Jul 2021 08:10 )             31.0       COUMADIN:  No. (ASA only)     PT/INR - ( 09 Jul 2021 10:47 )   PT: 13.6 sec;   INR: 1.14     PTT - ( 09 Jul 2021 10:47 )  PTT:32.0 sec    07-11    140  |  105  |  16  ----------------------------<  108<H>  4.2   |  26  |  0.79    Ca    9.8      11 Jul 2021 08:10  Phos  1.9     07-09  Mg     2.0     07-11    TPro  6.1  /  Alb  3.8  /  TBili  0.8  /  DBili  x   /  AST  27  /  ALT  10  /  AlkPhos  35<L>  07-09      Discharge CXR:  < from: Xray Chest 2 Views PA/Lat (07.10.21 @ 11:24) >  Findings/  impression: Stable cardiomegaly, thoracic aortic calcification, mitral surgery, anterior clips.. Right basilar opacity/pleural effusion stable. Right subcutaneous emphysema, decreased. Left basilar opacity/pleural effusion, decreased. Stable bony structures, scoliosis..  < end of copied text >    Discharge ECHO:  < from: TTE Echo Complete w/o Contrast w/ Doppler (07.10.21 @ 11:44) >  CONCLUSIONS:   1. The right atrium is normal in size. Vague echodensity noted in the right atrium - artifact vs prominent Eustachian valve.   2. S/p mitral valve repair. The mitral valve is mildly thickened. The mean transvalvular gradient is 2.24 mmHg at a heart rate of 77 bpm. There is trace mitral regurgitation.   3. Normal left and right ventricular size and systolic function.   4. No pericardial effusion.   5. No prior transthoracic echo is available for comparison.  < end of copied text >

## 2021-07-11 NOTE — DISCHARGE NOTE PROVIDER - HOSPITAL COURSE
75 year old male with PMHx of HLD, Parkison's disease, BPH s/p prostatectomy, osteoarthritis s/p right TKR, ulcerative colitis, SBO s/p laparotomy, small bowel resection, known mitral valve prolapse followed by cardiologist Dr. Warren Lee with serial echocardiograms presented to his cardiologist complaining of worsening YO/SOB and decreased exercise tolerance. Patient had YURY on 6/1/21 revealing severe prolapse of mitral valve with severe regurgitation. He was referred to Dr. Ty and deemed a surgical candidate. He presented to [facility] on 7/7/21 for planned procedure. Patient underwent mitral valve repair via R thoracotomy with Dr. Ty. Procedure was uncomplicated and he was transferred to CT ICU post procedure in primacor in stable condition. He was extubated POD#0. He was weaned off primacor by POD#1, remained stable and transferred to 9L on POD#2. TTE on POD#3 reveled trace MR with functioning valve. He continued to remain stable, ambualting on room air and as per Dr. Chandler is ready for discharge home on POD#___.     35 minutes was spent with the patient reviewing the discharge material including medications, follow up appointments, recovery, concerning symptoms, and how to contact their health care providers if they have questions                75 year old male with PMHx of HLD, Parkison's disease, BPH s/p prostatectomy, osteoarthritis s/p right TKR, ulcerative colitis, SBO s/p laparotomy, small bowel resection, known mitral valve prolapse followed by cardiologist Dr. Warren Lee with serial echocardiograms presented to his cardiologist complaining of worsening YO/SOB and decreased exercise tolerance. Patient had YURY on 6/1/21 revealing severe prolapse of mitral valve with severe regurgitation. He was referred to Dr. yT and deemed a surgical candidate. He presented to [facility] on 7/7/21 for planned procedure. Patient underwent mitral valve repair via R thoracotomy with Dr. Ty. Procedure was uncomplicated and he was transferred to CT ICU post procedure on primacor in stable condition. He was extubated POD#0. He was weaned off primacor by POD#1, remained stable and transferred to 9L on POD#2. TTE on POD#3 reveled trace MR with functioning valve. He continued to remain stable, ambualting on room air and as per Dr. Chandler is ready for discharge home on POD#4. Ambulating well, urinating appropriately, regular BMs.     35 minutes was spent with the patient reviewing the discharge material including medications, follow up appointments, recovery, concerning symptoms, and how to contact their health care providers if they have questions

## 2021-07-11 NOTE — DISCHARGE NOTE PROVIDER - PROVIDER TOKENS
PROVIDER:[TOKEN:[[id number]]],FREE:[LAST:[Dr. Vance],FIRST:[Warren],PHONE:[phone]],FAX:[(   [phone]    -],ADDRESS:[[address] [place] [address]]]

## 2021-07-11 NOTE — DISCHARGE NOTE NURSING/CASE MANAGEMENT/SOCIAL WORK - PATIENT PORTAL LINK FT
You can access the FollowMyHealth Patient Portal offered by [facility] by registering at the following website: [email or web address]. By joining [employer]’s FollowMyHealth portal, you will also be able to view your health information using other applications (apps) compatible with our system.

## 2021-07-11 NOTE — DISCHARGE NOTE PROVIDER - CARE PROVIDER_API CALL
Ant yT (MD)  Cardiovascular Surgery  [address] [place] [address]  Phone: [phone]  Fax: [phone]  Follow Up Time:     [place] [address]  Phone: [phone]  Fax: (   )    -  Follow Up Time:

## 2021-07-11 NOTE — DISCHARGE NOTE PROVIDER - NSDCCPTREATMENT_GEN_ALL_CORE_FT
PRINCIPAL PROCEDURE  Procedure: Minimally invasive repair or replacement of mitral valve with transesophageal echocardiography (YURY)  Findings and Treatment: Mitral valve repair

## 2021-07-11 NOTE — PROGRESS NOTE ADULT - ASSESSMENT
D/C Instructions:     DASH Diet:  1. Grains: 6-8 servings per day. Examples: Whole grains/pasta, brown rice.  2. Fruits and Vegetables: 4-5 servings per day each.  3. Dairy: 2-3 servings per day. Examples: Low-fat or fat free yogurt, low-fat or skim milk or cheeses.   4. Lean Meat (Fish & Poultry): No more than 6oz. in a day. Avoid Red Meat.   5. Nuts/seeds: 4-5 servings per WEEK. Examples: Almonds, sunflower seeds, lentils. Avoid salted nuts.   6. Fats/oils: 2-3 servings per day. Examples: Olive oil, fat-free low-sodium spreads. Avoid fried foods, lard or   butter.  7. Sweets: Sparingly, less than 2-3 servings per week.    -No heavy lifting/straining, Showering allowed, Stairs allowed, Walking - Indoors allowed, Walking - Outdoors allowed    -Walk daily as tolerated and use your incentive spirometer 10 times every hour while you are awake.     -Please weigh yourself daily. If you notice over a 3 pound weight gain in 3 days, this is a sign you are likely retaining too much fluid. It is imperative you call our right away with unexplained rapid weight gain.      -Please continue to wear the compression stockings given to you in the hospital at home. This is a way to prevent fluid from building up in your legs.     -No driving or strenuous activity/exercise until cleared by your surgeon.    -Gently clean your incisions with unscented/antibacterial soap and water, pat dry.  You may leave them open to air.    -Call your doctor if you have shortness of breath, chest pain not relieved by pain medication, dizziness, fever >101.5, or increased redness or drainage from incisions.    Ant Ty)  Cardiovascular Surgery  [address], [address] [place] [address]  Phone: [phone]  Fax: [phone]  Follow Up Time:     Warren Holden  [address] [place] [id number]  Phone: [phone]  Fax: (   )    -  Follow Up Time:     -Please attend your discharge appointments.    -Our office will reach out to you regarding your discharge appointments on Monday 7/12. If you do not hear from the office by the end of the day, please call to inquire about your appointments at [phone].   
76 yo male with PMHx of HLD, Parkinson's disease, BPH s/p prostatectomy, osteoarthritis s/p right TKR, ulcerative colitis, SBO s/p laparotomies, small bowel resection presents and mitral valve prolapse with severe mitral regurgitation. Patient followed by cardiologist, Dr. Warren Lee with serial echocardiograms. He reports worsening SOB/YO and decreased exercise tolerance over past few months. He denies c/o chest pain, sob at rest, fever, lower extremity, syncope or palpitations. 6/1/21 YURY revealed severe prolapse of mitral valve with severe regurgitation. 5/20/21 Cardiac cath at [facility] revealed normal coronary arteries. Patient is now s/p Minimally invasive MV repair on 7/7/2021. OR uncomplicated.  Arrived on primacor.  Extubated POD 0.  POD 1, home meds resumed. Pleural drain removed.  Primacor weaned off.  POD 2, BB initiated, pacing wires and irina drain removed, transfer to floor care.     Neurovascular:   PMHx of parkinson disease     -continue home Sinemet, gabapentin  -Continue home Lexapro  -Continue Melatonin prn for insomnia  No delirium. Pain well controlled with current regimen.    -Oxy and Tylenol prn    Cardiovascular:   Hemodynamically stable. HR controlled  -normal coronaries   BP  -continue Lopressor 25q6, for BP control       Respiratory:   02 Sat = 98% on RA.  -Wean to RA from for O2 Sat > 93%.  -Encourage Cough, deep breathing and Use of IS 10x / hr while awake.  -Chest PT 4xdaily    GI:   PMHx of UC    - Continue Hyoscyamine Prn for abd cramping  -protonix for GI protection  -Miralax added for bowl regemin  -PO DASH diet  -Last BM: prior to surgery    Renal / :   BUN/Cr Stable  -Continue to monitor I/O's.    Endocrine:    Blood sugar stable   no PMHx of DMII or thyroid dysfunction     - Not requiring insulin coverage, isss discontinued     Hematologic:  H/H stable  -DVT prophylaxis with Heparin sq    ID:  -Afebrile  -Continue to observe for SIRS/Sepsis Syndrome.    Disposition:  Possible home over the weekend    
76 y/o M with PMHx of HLD, Parkinson's disease, BPH s/p prostatectomy, osteoarthritis s/p right TKR, ulcerative colitis, SBO (s/p multiple laparotomies, small bowel resection) presents and mitral valve prolapse with severe mitral regurgitation. Patient followed by cardiologist, Dr. Warren Lee with serial echocardiograms. He reported worsening SOB/YO and decreased exercise tolerance over past few months. 6/1/21 YURY revealed severe prolapse of mitral valve with severe regurgitation. 5/20/21 Cardiac cath at [facility] revealed normal coronary arteries. Deemed a good surgical candidate by Dr. Ty and on 7/7/21 pt underwent Minimally invasive MV repair. OR course uncomplicated. Arrived to CTICU on primacor and was later extubated POD 0. On POD 1, pt home meds resumed. Pleural drain removed.  Primacor weaned off.  POD 2, BB initiated, pacing wires and irina drain removed, transfer to floor care. POD3 pt doing well, added bowel regimen given pt SBO history and no BM yet since surgery. Dispo planning for tomorrow.    Plan:    Neurovascular:   -Pain well controlled with current regimen. PRN's: acetaminophen, percocet   -Insomnia: melatonin   -Mood disorder: c/w lexapro, gabapentin  -Parkinson's dx: continue with sinemet     Cardiovascular:   -POD3 from mini MVR    -c/w BB (titrate as tolerated), ASA  -Hemodynamically stable.   -Monitor: BP, HR, tele    Respiratory:   -Oxygenating well on room air  -Encourage continued use of IS 10x/hr and frequent ambulation  -CXR: no acute pathology, no PTX   -Patient pulling 1000cc on IS     GI:  -GI PPX: pantoprazole 40mg daily   -PO Diet: DASH/TLC   -hx of UC  -hx of SBO s/p resection in past     -needs aggressive bowel regimen given history    -no BM yet since surgery     -Bowel Regimen: senna, miralax, PRN dulcolax    Renal / :  -started lasix 20mg daily and potassium 10mEq daily  -BPH: continue with finasteride, tamsulosin     -hx of recent prostate resection procedure due to symptomatic BPH, urinates appropriately now   -Continue to monitor renal function: BUN/Cr 24/0.77   -Monitor I/O's daily     Endocrine:    -No hx of DM or thyroid dx  -A1c: pending   -TSH: pending     Hematologic:  -CBC: H/H- 9.1/27   -Coagulation Panel: WNL     ID:  -Temperature: afebrile   -CBC: WBC- 14   -Continue to observe for SIRS/Sepsis Syndrome.    Prophylaxis:  -DVT prophylaxis with 5000 SubQ Heparin q8h.  -Continue with SCD's b/l while patient is at rest     Disposition:  -Discharge home once patient is medically ready, possible tomorrow

## 2021-07-11 NOTE — DISCHARGE NOTE PROVIDER - INSTRUCTIONS
DASH Diet:  1. Grains: 6-8 servings per day. Examples: Whole grains/pasta, brown rice.  2. Fruits and Vegetables: 4-5 servings per day each.  3. Dairy: 2-3 servings per day. Examples: Low-fat or fat free yogurt, low-fat or skim milk or cheeses.   4. Lean Meat (Fish & Poultry): No more than 6oz. in a day. Avoid Red Meat.   5. Nuts/seeds: 4-5 servings per WEEK. Examples: Almonds, sunflower seeds, lentils. Avoid salted nuts.   6. Fats/oils: 2-3 servings per day. Examples: Olive oil, fat-free low-sodium spreads. Avoid fried foods, lard or butter.  7. Sweets: Sparingly, less than 2-3 servings per week.

## 2021-07-11 NOTE — PROGRESS NOTE ADULT - REASON FOR ADMISSION
Mitral Valve Regurgitation

## 2021-07-11 NOTE — DISCHARGE NOTE PROVIDER - NSDCFUADDINST_GEN_ALL_CORE_FT
-Walk daily as tolerated and use your incentive spirometer 10 times every hour while you are awake.     -Please weigh yourself daily. If you notice over a 3 pound weight gain in 3 days, this is a sign you are likely retaining too much fluid. It is imperative you call our right away with unexplained rapid weight gain.      -Please continue to wear the compression stockings given to you in the hospital at home. This is a way to prevent fluid from building up in your legs.     -No driving or strenuous activity/exercise until cleared by your surgeon.    -Gently clean your incisions with unscented/antibacterial soap and water, pat dry.  You may leave them open to air.    -Call your doctor if you have shortness of breath, chest pain not relieved by pain medication, dizziness, fever >101.5, or increased redness or drainage from incisions.

## 2021-07-12 ENCOUNTER — APPOINTMENT (OUTPATIENT)
Dept: CARE COORDINATION | Facility: HOME HEALTH | Age: 76
End: 2021-07-12
Payer: MEDICARE

## 2021-07-12 PROBLEM — N40.1 BENIGN PROSTATIC HYPERPLASIA WITH LOWER URINARY TRACT SYMPTOMS: Chronic | Status: ACTIVE | Noted: 2021-07-01

## 2021-07-12 PROBLEM — G20 PARKINSON'S DISEASE: Chronic | Status: ACTIVE | Noted: 2021-07-01

## 2021-07-12 PROBLEM — Z86.79 PERSONAL HISTORY OF OTHER DISEASES OF THE CIRCULATORY SYSTEM: Chronic | Status: ACTIVE | Noted: 2021-07-01

## 2021-07-12 PROBLEM — K56.609 UNSPECIFIED INTESTINAL OBSTRUCTION, UNSPECIFIED AS TO PARTIAL VERSUS COMPLETE OBSTRUCTION: Chronic | Status: ACTIVE | Noted: 2021-07-01

## 2021-07-12 PROBLEM — K51.90 ULCERATIVE COLITIS, UNSPECIFIED, WITHOUT COMPLICATIONS: Chronic | Status: ACTIVE | Noted: 2021-07-01

## 2021-07-12 PROBLEM — K21.9 GASTRO-ESOPHAGEAL REFLUX DISEASE WITHOUT ESOPHAGITIS: Chronic | Status: ACTIVE | Noted: 2021-07-01

## 2021-07-12 PROCEDURE — 99024 POSTOP FOLLOW-UP VISIT: CPT

## 2021-07-12 RX ORDER — CRANBERRY FRUIT EXTRACT 650 MG
CAPSULE ORAL
Refills: 0 | Status: ACTIVE | COMMUNITY

## 2021-07-12 RX ORDER — ALFUZOSIN HYDROCHLORIDE 10 MG/1
10 TABLET, EXTENDED RELEASE ORAL
Qty: 30 | Refills: 0 | Status: ACTIVE | COMMUNITY

## 2021-07-12 RX ORDER — DIPHENOXYLATE HYDROCHLORIDE AND ATROPINE SULFATE 2.5; .025 MG/1; MG/1
2.5-0.025 TABLET ORAL 4 TIMES DAILY
Refills: 0 | Status: DISCONTINUED | COMMUNITY
End: 2021-07-12

## 2021-07-12 RX ORDER — NAPROXEN SODIUM 220 MG
TABLET ORAL
Refills: 0 | Status: DISCONTINUED | COMMUNITY
End: 2021-07-12

## 2021-07-12 RX ORDER — ATORVASTATIN CALCIUM 20 MG/1
20 TABLET, FILM COATED ORAL
Qty: 30 | Refills: 3 | Status: DISCONTINUED | COMMUNITY
End: 2021-07-12

## 2021-07-12 RX ORDER — CARBIDOPA AND LEVODOPA 25; 100 MG/1; MG/1
25-100 TABLET ORAL 3 TIMES DAILY
Refills: 0 | Status: ACTIVE | COMMUNITY

## 2021-07-12 RX ORDER — GABAPENTIN 300 MG/1
300 CAPSULE ORAL
Qty: 30 | Refills: 0 | Status: ACTIVE | COMMUNITY

## 2021-07-12 RX ORDER — CHLORHEXIDINE GLUCONATE 4 %
1000 LIQUID (ML) TOPICAL DAILY
Qty: 30 | Refills: 5 | Status: ACTIVE | COMMUNITY

## 2021-07-12 RX ORDER — DUTASTERIDE 0.5 MG/1
0.5 CAPSULE, LIQUID FILLED ORAL DAILY
Refills: 0 | Status: ACTIVE | COMMUNITY

## 2021-07-12 RX ORDER — TADALAFIL 5 MG/1
5 TABLET, FILM COATED ORAL
Qty: 30 | Refills: 0 | Status: DISCONTINUED | COMMUNITY
End: 2021-07-12

## 2021-07-12 NOTE — PHYSICAL EXAM
[Sclera] : the sclera and conjunctiva were normal [Extraocular Movements] : extraocular movements were intact [Neck Appearance] : the appearance of the neck was normal [] : no respiratory distress [Respiration, Rhythm And Depth] : normal respiratory rhythm and effort [Exaggerated Use Of Accessory Muscles For Inspiration] : no accessory muscle use [Skin Color & Pigmentation] : normal skin color and pigmentation [No Focal Deficits] : no focal deficits [Oriented To Time, Place, And Person] : oriented to person, place, and time [Impaired Insight] : insight and judgment were intact [Affect] : the affect was normal [Mood] : the mood was normal [FreeTextEntry1] : Right thoracotomy incision well approximated. No erythema or drainage. CT sites x 2 healing. Moderate periwound ecchymosis that is resolving.

## 2021-07-12 NOTE — HISTORY OF PRESENT ILLNESS
[FreeTextEntry1] : As per EMR:\par \[facility] Hospital Course: 75 year old male with PMHx of HLD, Parkinson's disease, BPH s/p prostatectomy, osteoarthritis s/p right TKR, ulcerative colitis, SBO s/p laparotomy, small bowel resection, known mitral valve prolapse followed by cardiologist Dr. Warren Lee with serial echocardiograms presented to his cardiologist complaining of worsening YO/SOB and decreased exercise tolerance. Patient had YURY on 6/1/21 revealing severe prolapse of mitral valve with severe regurgitation. He was referred to Dr. Ty and deemed a surgical candidate. He presented to [facility] on 7/7/21 for planned procedure. Patient underwent mitral valve repair via R thoracotomy with Dr. Ty. Procedure was uncomplicated and he was transferred to CT ICU post procedure on Primacor in stable condition. He was extubated POD#0. He was weaned off Primacor by POD#1, remained stable and transferred to  on POD#2. TTE on POD#3 reveled trace MR with functioning valve. He continued to remain stable, ambulating on room air and as per Dr. Chandler is ready for discharge home on POD#4. Ambulating well, urinating appropriately, regular BMs. \par \par Pt. was discharged home on 7/11/21. Labs & studies reviewed.\par \par Pt. is seen today via telemedicine in routine f/u.\par \par 24 hour post discharge phone call.\par \par Spoke with pt.for f/u s/p hospitalization.  \par Pt denies any new complaints/issues/concerns at this time. \par Pt. states he has all medications, and is eating, drinking, urinating and moving bowels without difficulty.\par His weight is stable at 144.6. His pain is well managed with Tylenol only.\par Cardiac surgery discharge instructions reviewed with pt. understanding.  \par All questions answered. Emotional support offered.\par Reminded pt. about [facility] program and ACP role.  \par Pt. given [facility] contact phone number.  \par Encouraged to call with any further questions or concerns.\par F/U home visit scheduled for

## 2021-07-12 NOTE — REVIEW OF SYSTEMS
[Lower Ext Edema] : lower extremity edema [Negative] : Psychiatric [FreeTextEntry6] : IS to 1500 mL. [FreeTextEntry7] : Last BM today.

## 2021-07-12 NOTE — ASSESSMENT
[FreeTextEntry1] : WDWN, older male doing well s/p MV repair via right thoracotomy approach.\par \par Problems:\par 1. HLD/MVP: s/p MV repair\par c/w daily weights, temps and showers.\par Continue with current meds: ASA, Atorvastatin, Lasix, KCL, & Metoprolol\par c/w Tylenol/Percocet PRN pain.\par c/w Miralax PRN constipation.\par c/w Pepcid for GI ppx.\par Keep legs elevated.\par Incentive spirometry.\par Homecare- [facility].\par Diet- low salt, low fat.\par f/u appts - CTS, Cardiology & PCP.\par [facility] team will continue to f/u with pt status. \par Pt agrees to call with any questions, issues or concerns.\par \par 2. BPH/s/p prostatectomy\par c/w Avodart & Alfuzosin\par f/u with PCP/urology\par \par 3. Parkinson's\par c/w Sinemet f/u with PCP/neurology\par \par 4. Ulcerative colitis/s/p small bowel resection\par c/w Levsin\par f/u with PCP/GI

## 2021-07-12 NOTE — REASON FOR VISIT
[Home] : at home, [unfilled] , at the time of the visit. [Other Location: e.g. Home (Enter Location, City,State)___] : at [unfilled] [Spouse] : spouse [Verbal consent obtained from patient] : the patient, [unfilled] [Post Op: _________] : a [unfilled] post op visit

## 2021-07-13 LAB — SURGICAL PATHOLOGY STUDY: SIGNIFICANT CHANGE UP

## 2021-07-16 DIAGNOSIS — K51.90 ULCERATIVE COLITIS, UNSPECIFIED, WITHOUT COMPLICATIONS: ICD-10-CM

## 2021-07-16 DIAGNOSIS — E83.39 OTHER DISORDERS OF PHOSPHORUS METABOLISM: ICD-10-CM

## 2021-07-16 DIAGNOSIS — N40.1 BENIGN PROSTATIC HYPERPLASIA WITH LOWER URINARY TRACT SYMPTOMS: ICD-10-CM

## 2021-07-16 DIAGNOSIS — E78.5 HYPERLIPIDEMIA, UNSPECIFIED: ICD-10-CM

## 2021-07-16 DIAGNOSIS — F39 UNSPECIFIED MOOD [AFFECTIVE] DISORDER: ICD-10-CM

## 2021-07-16 DIAGNOSIS — G47.00 INSOMNIA, UNSPECIFIED: ICD-10-CM

## 2021-07-16 DIAGNOSIS — R35.1 NOCTURIA: ICD-10-CM

## 2021-07-16 DIAGNOSIS — I34.0 NONRHEUMATIC MITRAL (VALVE) INSUFFICIENCY: ICD-10-CM

## 2021-07-16 DIAGNOSIS — M19.90 UNSPECIFIED OSTEOARTHRITIS, UNSPECIFIED SITE: ICD-10-CM

## 2021-07-16 DIAGNOSIS — K21.9 GASTRO-ESOPHAGEAL REFLUX DISEASE WITHOUT ESOPHAGITIS: ICD-10-CM

## 2021-07-16 DIAGNOSIS — G20 PARKINSON'S DISEASE: ICD-10-CM

## 2021-07-16 DIAGNOSIS — I51.1 RUPTURE OF CHORDAE TENDINEAE, NOT ELSEWHERE CLASSIFIED: ICD-10-CM

## 2021-07-20 ENCOUNTER — OUTPATIENT (OUTPATIENT)
Dept: OUTPATIENT SERVICES | Facility: HOSPITAL | Age: 76
LOS: 1 days | End: 2021-07-20
Payer: MEDICARE

## 2021-07-20 ENCOUNTER — APPOINTMENT (OUTPATIENT)
Dept: CARDIOTHORACIC SURGERY | Facility: CLINIC | Age: 76
End: 2021-07-20
Payer: MEDICARE

## 2021-07-20 VITALS
SYSTOLIC BLOOD PRESSURE: 130 MMHG | WEIGHT: 135 LBS | HEIGHT: 66 IN | BODY MASS INDEX: 21.69 KG/M2 | HEART RATE: 70 BPM | OXYGEN SATURATION: 96 % | TEMPERATURE: 98 F | RESPIRATION RATE: 17 BRPM | DIASTOLIC BLOOD PRESSURE: 63 MMHG

## 2021-07-20 DIAGNOSIS — Z90.49 ACQUIRED ABSENCE OF OTHER SPECIFIED PARTS OF DIGESTIVE TRACT: Chronic | ICD-10-CM

## 2021-07-20 DIAGNOSIS — J90 PLEURAL EFFUSION, NOT ELSEWHERE CLASSIFIED: ICD-10-CM

## 2021-07-20 DIAGNOSIS — Z98.890 OTHER SPECIFIED POSTPROCEDURAL STATES: Chronic | ICD-10-CM

## 2021-07-20 DIAGNOSIS — Z96.651 PRESENCE OF RIGHT ARTIFICIAL KNEE JOINT: Chronic | ICD-10-CM

## 2021-07-20 PROCEDURE — 71046 X-RAY EXAM CHEST 2 VIEWS: CPT

## 2021-07-20 PROCEDURE — 71046 X-RAY EXAM CHEST 2 VIEWS: CPT | Mod: 26

## 2021-07-20 PROCEDURE — 99024 POSTOP FOLLOW-UP VISIT: CPT

## 2021-07-26 ENCOUNTER — APPOINTMENT (OUTPATIENT)
Dept: THORACIC SURGERY | Facility: CLINIC | Age: 76
End: 2021-07-26
Payer: MEDICARE

## 2021-07-26 PROCEDURE — 99024 POSTOP FOLLOW-UP VISIT: CPT

## 2021-07-26 PROCEDURE — 32554Z: CUSTOM | Mod: 78

## 2021-07-28 ENCOUNTER — NON-APPOINTMENT (OUTPATIENT)
Age: 76
End: 2021-07-28

## 2021-07-29 NOTE — CONSULT LETTER
[Dear  ___] : Dear  [unfilled], [Consult Letter:] : I had the pleasure of evaluating your patient, [unfilled]. [Please see my note below.] : Please see my note below. [Consult Closing:] : Thank you very much for allowing me to participate in the care of this patient.  If you have any questions, please do not hesitate to contact me. [Sincerely,] : Sincerely, [FreeTextEntry3] : Jordon Quintanilla MD\par Cardiothoracic Surgery\par [facility]

## 2021-07-29 NOTE — ASSESSMENT
[FreeTextEntry1] : This patient is a 75-year-old gentleman status post mitral valve repair.  Patient was found to have a right pleural effusion.  It was drained in the office.  Approximately 1300 cc was removed.  Patient tolerated the procedure well.  The patient will increase activity as tolerated.  If he notices worsening shortness of breath he will get a repeat x-ray.  All questions were answered.

## 2021-07-29 NOTE — REASON FOR VISIT
[Initial Evaluation] : an initial evaluation [Spouse] : spouse [FreeTextEntry1] : Right pleural effusion status post cardiac surgery

## 2021-07-29 NOTE — PHYSICAL EXAM
[General Appearance - Alert] : alert [General Appearance - In No Acute Distress] : in no acute distress [Sclera] : the sclera and conjunctiva were normal [PERRL With Normal Accommodation] : pupils were equal in size, round, and reactive to light [Extraocular Movements] : extraocular movements were intact [Outer Ear] : the ears and nose were normal in appearance [Oropharynx] : the oropharynx was normal [Neck Appearance] : the appearance of the neck was normal [Neck Cervical Mass (___cm)] : no neck mass was observed [Jugular Venous Distention Increased] : there was no jugular-venous distention [Thyroid Diffuse Enlargement] : the thyroid was not enlarged [Thyroid Nodule] : there were no palpable thyroid nodules [FreeTextEntry1] : Decreased breath sounds at the right base [Heart Rate And Rhythm] : heart rate was normal and rhythm regular [Heart Sounds] : normal S1 and S2 [Heart Sounds Gallop] : no gallops [Murmurs] : no murmurs [Heart Sounds Pericardial Friction Rub] : no pericardial rub [Examination Of The Chest] : the chest was normal in appearance [Chest Visual Inspection Thoracic Asymmetry] : no chest asymmetry [Diminished Respiratory Excursion] : normal chest expansion [Bowel Sounds] : normal bowel sounds [Abdomen Soft] : soft [Abdomen Tenderness] : non-tender [Abdomen Mass (___ Cm)] : no abdominal mass palpated [Cervical Lymph Nodes Enlarged Anterior Bilaterally] : anterior cervical [Cervical Lymph Nodes Enlarged Posterior Bilaterally] : posterior cervical [Supraclavicular Lymph Nodes Enlarged Bilaterally] : supraclavicular [Axillary Lymph Nodes Enlarged Bilaterally] : axillary [Femoral Lymph Nodes Enlarged Bilaterally] : femoral [Inguinal Lymph Nodes Enlarged Bilaterally] : inguinal [No CVA Tenderness] : no ~M costovertebral angle tenderness [No Spinal Tenderness] : no spinal tenderness [Skin Color & Pigmentation] : normal skin color and pigmentation [Skin Turgor] : normal skin turgor [] : no rash [Deep Tendon Reflexes (DTR)] : deep tendon reflexes were 2+ and symmetric [Sensation] : the sensory exam was normal to light touch and pinprick [No Focal Deficits] : no focal deficits

## 2021-07-29 NOTE — HISTORY OF PRESENT ILLNESS
[FreeTextEntry1] : This is a 75 year old male, s/p minimally invasive MV repair on 7/7/21.  The patient was found to have some shortness of breath and a large pleural effusion on the right on chest x-ray.  Patient is here to discuss and have a thoracentesis performed.  The patient only complains of shortness of breath he has no other complaints.  He is otherwise doing well

## 2021-07-29 NOTE — PROCEDURE
[FreeTextEntry1] : The patient was consented for a right thoracentesis.  The films were reviewed.  The patient was sitting up.  His posterior right chest was prepped and draped in standard surgical fashion.  5 cc of 1% lidocaine was injected in the ninth intercostal space posteriorly.  A thoracentesis was done with a catheter.  Approximately 1300 cc of dark fluid was removed.  The patient tolerated the procedure well.  A dry sterile dressing was placed.

## 2021-07-29 NOTE — DATA REVIEWED
[FreeTextEntry1] : CXR 7/20/21: Stable cardiomegaly, thoracic aortic calcification, mitral valve repair, anterior clips. Right basilar opacity/pleural effusion, increased. Left basilar opacity/pleural effusion and right subcutaneuous emphysema, decreased. Stable bony structures. Left rib old fractures. Scoliosis.  Statement Selected

## 2021-07-30 ENCOUNTER — NON-APPOINTMENT (OUTPATIENT)
Age: 76
End: 2021-07-30

## 2021-07-30 RX ORDER — FUROSEMIDE 20 MG/1
20 TABLET ORAL DAILY
Qty: 5 | Refills: 0 | Status: COMPLETED | COMMUNITY
Start: 2021-07-12 | End: 2021-07-30

## 2021-07-30 RX ORDER — POTASSIUM CHLORIDE 750 MG/1
10 TABLET, FILM COATED, EXTENDED RELEASE ORAL DAILY
Qty: 5 | Refills: 0 | Status: COMPLETED | COMMUNITY
Start: 2021-07-12 | End: 2021-07-30

## 2021-08-02 ENCOUNTER — NON-APPOINTMENT (OUTPATIENT)
Age: 76
End: 2021-08-02

## 2021-08-10 ENCOUNTER — TRANSCRIPTION ENCOUNTER (OUTPATIENT)
Age: 76
End: 2021-08-10

## 2021-08-17 ENCOUNTER — APPOINTMENT (OUTPATIENT)
Dept: CARDIOTHORACIC SURGERY | Facility: CLINIC | Age: 76
End: 2021-08-17
Payer: MEDICARE

## 2021-08-17 PROCEDURE — 99024 POSTOP FOLLOW-UP VISIT: CPT

## 2021-10-12 ENCOUNTER — NON-APPOINTMENT (OUTPATIENT)
Age: 76
End: 2021-10-12

## 2021-10-18 ENCOUNTER — FORM ENCOUNTER (OUTPATIENT)
Age: 76
End: 2021-10-18

## 2021-10-19 ENCOUNTER — OUTPATIENT (OUTPATIENT)
Dept: OUTPATIENT SERVICES | Facility: HOSPITAL | Age: 76
LOS: 1 days | End: 2021-10-19
Payer: MEDICARE

## 2021-10-19 ENCOUNTER — APPOINTMENT (OUTPATIENT)
Dept: CARDIOTHORACIC SURGERY | Facility: CLINIC | Age: 76
End: 2021-10-19
Payer: MEDICARE

## 2021-10-19 VITALS
SYSTOLIC BLOOD PRESSURE: 146 MMHG | HEART RATE: 75 BPM | TEMPERATURE: 98.1 F | BODY MASS INDEX: 21.05 KG/M2 | RESPIRATION RATE: 17 BRPM | HEIGHT: 66 IN | WEIGHT: 131 LBS | DIASTOLIC BLOOD PRESSURE: 71 MMHG | OXYGEN SATURATION: 96 %

## 2021-10-19 DIAGNOSIS — Z98.890 OTHER SPECIFIED POSTPROCEDURAL STATES: Chronic | ICD-10-CM

## 2021-10-19 DIAGNOSIS — Z98.890 OTHER SPECIFIED POSTPROCEDURAL STATES: ICD-10-CM

## 2021-10-19 DIAGNOSIS — Z96.651 PRESENCE OF RIGHT ARTIFICIAL KNEE JOINT: Chronic | ICD-10-CM

## 2021-10-19 DIAGNOSIS — Z90.49 ACQUIRED ABSENCE OF OTHER SPECIFIED PARTS OF DIGESTIVE TRACT: Chronic | ICD-10-CM

## 2021-10-19 PROCEDURE — 93017 CV STRESS TEST TRACING ONLY: CPT

## 2021-10-19 PROCEDURE — 99212 OFFICE O/P EST SF 10 MIN: CPT

## 2021-10-21 NOTE — HISTORY OF PRESENT ILLNESS
[FreeTextEntry1] : 77 yo male with PMH of HLD, Parkinson's disease, BPH s/t laser reduction surgery, osteoarthritis s/p right TKR, ulcerative colitis, SBO s/p laparotomies, small bowel resection and  mitral valve prolapse with severe mitral regurgitation s/p Minimally invasive mitral valve repair on 7/7/21. He required right thoracentesis for 1300cc on 7/26 by Dr. Quintanilla s/t large pleural effusion. Recently he has noted "lump" under right anterior mini thoracotomy incision that has increased in size and is tender to touch. He was started on Bactrim DS bid on 10/12/19. He presents for followup visit for wound check and for stress test prior to starting outpatient cardiac rehab. He appears well, NAD. He reports that redness/swelling/tenderness over right chest incision has greatly improved once started on Bactrim.

## 2021-10-21 NOTE — ASSESSMENT
[FreeTextEntry1] : 77 yo male s/p Minimally invasive mitral valve repair on 7/7/21 presents for follow up visit for wound check. \par \par Plan: \par continue current medications\par will complete bactrim Ds\par to have ultrasound of incision ordered by PCP\par will have exercise stress test today prior to starting outpatient cardiac rehab program\par follow up with Dr. Lee\par MARTI from CTS service

## 2023-09-25 ENCOUNTER — NON-APPOINTMENT (OUTPATIENT)
Age: 78
End: 2023-09-25

## 2023-09-27 ENCOUNTER — APPOINTMENT (OUTPATIENT)
Dept: PAIN MANAGEMENT | Facility: CLINIC | Age: 78
End: 2023-09-27
Payer: MEDICARE

## 2023-09-27 VITALS
OXYGEN SATURATION: 96 % | HEIGHT: 66 IN | HEART RATE: 76 BPM | DIASTOLIC BLOOD PRESSURE: 74 MMHG | WEIGHT: 135 LBS | BODY MASS INDEX: 21.69 KG/M2 | SYSTOLIC BLOOD PRESSURE: 140 MMHG

## 2023-09-27 PROCEDURE — 99203 OFFICE O/P NEW LOW 30 MIN: CPT

## 2023-09-28 RX ORDER — SILDENAFIL 100 MG/1
100 TABLET, FILM COATED ORAL
Refills: 0 | Status: ACTIVE | COMMUNITY

## 2023-09-28 RX ORDER — ASPIRIN 81 MG/1
81 TABLET, COATED ORAL DAILY
Refills: 0 | Status: COMPLETED | COMMUNITY
Start: 2021-07-12 | End: 2023-09-28

## 2023-09-28 RX ORDER — ROSUVASTATIN CALCIUM 10 MG/1
10 TABLET, FILM COATED ORAL
Refills: 0 | Status: ACTIVE | COMMUNITY

## 2023-09-28 RX ORDER — METOPROLOL TARTRATE 50 MG/1
50 TABLET, FILM COATED ORAL DAILY
Refills: 0 | Status: COMPLETED | COMMUNITY
Start: 2021-07-12 | End: 2023-09-28

## 2023-09-28 RX ORDER — ACETAMINOPHEN 325 MG/1
325 TABLET, FILM COATED ORAL EVERY 6 HOURS
Refills: 0 | Status: COMPLETED | COMMUNITY
Start: 2021-07-12 | End: 2023-09-28

## 2023-09-28 RX ORDER — TRAZODONE HYDROCHLORIDE 50 MG/1
50 TABLET ORAL
Refills: 0 | Status: ACTIVE | COMMUNITY

## 2023-09-28 RX ORDER — LORATADINE 10 MG
17 TABLET,DISINTEGRATING ORAL DAILY
Refills: 0 | Status: COMPLETED | COMMUNITY
Start: 2021-07-12 | End: 2023-09-28

## 2023-09-28 RX ORDER — ATORVASTATIN CALCIUM 20 MG/1
20 TABLET, FILM COATED ORAL
Qty: 1 | Refills: 0 | Status: COMPLETED | COMMUNITY
End: 2023-09-28

## 2023-09-28 RX ORDER — AMOXICILLIN 875 MG/1
TABLET, FILM COATED ORAL
Refills: 0 | Status: ACTIVE | COMMUNITY

## 2023-09-28 RX ORDER — OXYCODONE HYDROCHLORIDE AND ACETAMINOPHEN 5; 325 MG/1; MG/1
5-325 TABLET ORAL EVERY 6 HOURS
Refills: 0 | Status: COMPLETED | COMMUNITY
Start: 2021-07-12 | End: 2023-09-28

## 2023-09-28 RX ORDER — ESCITALOPRAM OXALATE 10 MG/1
10 TABLET ORAL DAILY
Refills: 0 | Status: COMPLETED | COMMUNITY
End: 2023-09-28

## 2023-09-28 RX ORDER — HYOSCYAMINE SULFATE 0.12 MG/1
0.12 TABLET SUBLINGUAL
Refills: 0 | Status: COMPLETED | COMMUNITY
End: 2023-09-28

## 2023-09-28 RX ORDER — ESCITALOPRAM OXALATE 20 MG/1
20 TABLET, FILM COATED ORAL
Refills: 0 | Status: ACTIVE | COMMUNITY

## 2023-09-28 RX ORDER — FAMOTIDINE 20 MG/1
20 TABLET, FILM COATED ORAL TWICE DAILY
Qty: 60 | Refills: 0 | Status: COMPLETED | COMMUNITY
End: 2023-09-28

## 2023-09-28 RX ORDER — ZOLPIDEM TARTRATE 5 MG/1
5 TABLET ORAL AT BEDTIME
Refills: 0 | Status: COMPLETED | COMMUNITY
End: 2023-09-28

## 2023-09-28 RX ORDER — GLUCOSAMINE HCL/CHONDROITIN SU 500-400 MG
3 CAPSULE ORAL AT BEDTIME
Refills: 0 | Status: COMPLETED | COMMUNITY
End: 2023-09-28

## 2023-09-28 RX ORDER — OMEPRAZOLE 20 MG/1
20 TABLET, DELAYED RELEASE ORAL
Refills: 0 | Status: ACTIVE | COMMUNITY

## 2023-09-28 RX ORDER — SULFAMETHOXAZOLE AND TRIMETHOPRIM 800; 160 MG/1; MG/1
800-160 TABLET ORAL
Qty: 14 | Refills: 0 | Status: COMPLETED | COMMUNITY
End: 2023-09-28

## 2023-10-11 ENCOUNTER — APPOINTMENT (OUTPATIENT)
Dept: PAIN MANAGEMENT | Facility: HOSPITAL | Age: 78
End: 2023-10-11

## 2023-10-11 ENCOUNTER — TRANSCRIPTION ENCOUNTER (OUTPATIENT)
Age: 78
End: 2023-10-11

## 2023-10-25 ENCOUNTER — APPOINTMENT (OUTPATIENT)
Dept: PAIN MANAGEMENT | Facility: CLINIC | Age: 78
End: 2023-10-25
Payer: MEDICARE

## 2023-10-25 VITALS
SYSTOLIC BLOOD PRESSURE: 143 MMHG | OXYGEN SATURATION: 96 % | HEIGHT: 66 IN | BODY MASS INDEX: 21.69 KG/M2 | HEART RATE: 71 BPM | DIASTOLIC BLOOD PRESSURE: 63 MMHG | WEIGHT: 135 LBS

## 2023-10-25 PROCEDURE — 99213 OFFICE O/P EST LOW 20 MIN: CPT

## 2023-11-16 ENCOUNTER — TRANSCRIPTION ENCOUNTER (OUTPATIENT)
Age: 78
End: 2023-11-16

## 2023-11-16 ENCOUNTER — APPOINTMENT (OUTPATIENT)
Dept: PAIN MANAGEMENT | Facility: HOSPITAL | Age: 78
End: 2023-11-16

## 2023-12-01 ENCOUNTER — APPOINTMENT (OUTPATIENT)
Dept: PAIN MANAGEMENT | Facility: CLINIC | Age: 78
End: 2023-12-01
Payer: MEDICARE

## 2023-12-01 VITALS
BODY MASS INDEX: 21.69 KG/M2 | DIASTOLIC BLOOD PRESSURE: 97 MMHG | HEIGHT: 66 IN | OXYGEN SATURATION: 96 % | SYSTOLIC BLOOD PRESSURE: 162 MMHG | HEART RATE: 72 BPM | WEIGHT: 135 LBS

## 2023-12-01 PROCEDURE — 99213 OFFICE O/P EST LOW 20 MIN: CPT

## 2023-12-21 ENCOUNTER — APPOINTMENT (OUTPATIENT)
Dept: PAIN MANAGEMENT | Facility: HOSPITAL | Age: 78
End: 2023-12-21

## 2023-12-21 ENCOUNTER — TRANSCRIPTION ENCOUNTER (OUTPATIENT)
Age: 78
End: 2023-12-21

## 2024-01-09 ENCOUNTER — APPOINTMENT (OUTPATIENT)
Dept: PAIN MANAGEMENT | Facility: CLINIC | Age: 79
End: 2024-01-09
Payer: MEDICARE

## 2024-01-09 VITALS
WEIGHT: 135 LBS | OXYGEN SATURATION: 95 % | DIASTOLIC BLOOD PRESSURE: 97 MMHG | HEART RATE: 73 BPM | HEIGHT: 66 IN | SYSTOLIC BLOOD PRESSURE: 168 MMHG | BODY MASS INDEX: 21.69 KG/M2

## 2024-01-09 PROCEDURE — 99214 OFFICE O/P EST MOD 30 MIN: CPT

## 2024-01-09 RX ORDER — GABAPENTIN 100 MG/1
100 CAPSULE ORAL
Qty: 90 | Refills: 1 | Status: ACTIVE | COMMUNITY
Start: 2023-09-27 | End: 1900-01-01

## 2024-01-09 RX ORDER — NAPROXEN 500 MG/1
500 TABLET ORAL
Qty: 30 | Refills: 0 | Status: ACTIVE | COMMUNITY
Start: 2023-10-25 | End: 1900-01-01

## 2024-02-01 ENCOUNTER — APPOINTMENT (OUTPATIENT)
Dept: PAIN MANAGEMENT | Facility: HOSPITAL | Age: 79
End: 2024-02-01

## 2024-02-01 ENCOUNTER — TRANSCRIPTION ENCOUNTER (OUTPATIENT)
Age: 79
End: 2024-02-01

## 2024-02-08 NOTE — PRE-OP CHECKLIST - TAMPON REMOVED
Hematology referral reviewed for [facility], see below.    Referral reason: symptomatic iron deficiency anemia, not tolerating oral iron    Clinical question entered by referring provider or through order transcription: Persistent JOANNE, history of not tolerating oral iron. Hgb 7.7, Peripheral edema. Normal renals. Likely needs iron infusion     Referral received via: Internal referral by [facility] provider    Current abnormal labs: Available in Chart Review    Outreach: Call placed to patient but unable to connect or leave voicemail.    Plan: Triage instructions updated and sent to NPS for completion.    
n/a

## 2024-02-21 ENCOUNTER — APPOINTMENT (OUTPATIENT)
Dept: PAIN MANAGEMENT | Facility: CLINIC | Age: 79
End: 2024-02-21
Payer: MEDICARE

## 2024-02-21 VITALS
HEART RATE: 75 BPM | OXYGEN SATURATION: 97 % | HEIGHT: 66 IN | DIASTOLIC BLOOD PRESSURE: 60 MMHG | SYSTOLIC BLOOD PRESSURE: 130 MMHG | WEIGHT: 135 LBS | BODY MASS INDEX: 21.69 KG/M2

## 2024-02-21 PROCEDURE — G2211 COMPLEX E/M VISIT ADD ON: CPT

## 2024-02-21 PROCEDURE — 99214 OFFICE O/P EST MOD 30 MIN: CPT

## 2024-02-21 RX ORDER — PREGABALIN 50 MG/1
50 CAPSULE ORAL
Qty: 90 | Refills: 1 | Status: ACTIVE | COMMUNITY
Start: 2024-02-21 | End: 1900-01-01

## 2024-02-21 NOTE — DATA REVIEWED
[FreeTextEntry1] : MRI lumbar spine dated August 21, 2021 L1/2 mild retrolisthesis moderate left central disc protrusion which indents the left ventral aspect of the thecal sac and partially narrows the left lateral recess.  Mild spinal canal stenosis.  Mild foraminal narrowing.  L2-3 retrolisthesis with broad disc osteophyte complex indenting the thecal sac, mild facet arthropathy mild spinal canal stenosis.  Mild to moderate neuroforaminal stenosis.  L3-4 mild retrolisthesis mild disc bulge superimposed left central disc extrusion with caudal extension of disc material to the mid L4 vertebral body which indents the ventral thecal sac and narrows the left lateral recess encroaching upon the descending left L4 nerve.  Mild to moderate degree of spinal canal stenosis.  Moderate left neuroforaminal stenosis with contacting the exiting left L3 nerve.  L4-5 mild disc bulge and mild central disc protrusion indenting the thecal sac mild narrowing left lateral recess stenosis mild facet arthrosis greater on the left moderate left and mild right neuroforaminal stenosis.  L5/S1 mild disc bulge mildly indenting the thecal sac.  Mild facet hypertrophy.  No spinal canal stenosis.  Moderate left neuroforaminal stenosis.  Right neural foramen patent.

## 2024-02-21 NOTE — ASSESSMENT
[FreeTextEntry1] : Mr. MEETA HAMM is a 78 year M suffering from low back and left leg pain, that based upon today's subjective complaints, physical examination, and MRI review, is likely multifactorial with ongoing element of facet arthopathy and associated lumbar radiulopathy  >> Medications  Chronic opioid use for non-malignant pain, in particular at high doses would not be recommended since it can potentially lead to hyperalgesia (hypersensitivity), tolerance and addiction.  Lyrica 50 mg po titrate to tid as tolertated  >> Interventions  Exellent relief following RFA  >> Therapy and Other Modalities  CW PT  >> Imaging and Other Studies  MRI lumbar spine due to new radicular symptoms in LLE  >> Consults  None ordered.    Based upon current evaluation and management, I will be providing ongoing longitudinal care for this complex painful condition, described above. Patient is encouraged to contact me with any questions or concerns.

## 2024-02-21 NOTE — PHYSICAL EXAM
[de-identified] : General: AAOx3, NAD HEENT: EOMI, Sclera white CVS: +2 Pulses Pulmonary: No Respiratory Distress Abdomen: Soft, NT, ND MSK: Moving all extremities against gravity, Resting tremor + Straight leg raise positive LEFT Neuro: Sensation I to LT Extremities: (-)Edema Derm: No Rash Psych: Calm, Cooperative

## 2024-02-21 NOTE — HISTORY OF PRESENT ILLNESS
[Back Pain] : back pain [FreeTextEntry1] : Interval Note:     On February 1, 2024 patient underwent left L3-L4-L5 lumbar radiofrequency ablation 80% relief low back pain  Lately has pain down left leg into the lateral calf into the small toe  Medication has been allowing patient to go about activities of daily living with less pain.  Moving bowels regularly. No recent falls.   Patient denies any bowel/bladder incontinence, no saddle/perineal anesthesia or any other red flag signs or symptoms.   ---   left L3-L4-L5 lumbar diagnostic medial branch blocks December 21, 2023 with report of 80% relief.   Patient underwent left L3-L4-L5 diagnostic medial branch blocks on November 16, 2023 with 80% relief x 3 days ---  On October 11, 2023 patient underwent left L4 and L5 lumbar transforaminal epidural steroid injection with 80% relief of leg pain, but still has left sided back and hip pain  -- HPI - Mr. MEETA HAMM is a 78 year M with PHx as below, referred by FRIENDS  who presents today with chief complaints of consistent back pain that began 5 months ago. The pain is located in the lower back and referred to the left calf. The current, average, and minimum pain levels are all rated at 5/10, with a maximum intensity reaching 8/10. The pain is described as aching, throbbing, and shooting in nature. Activities that exacerbate the pain include sitting, standing, transitioning, bending, and lifting. Relief measures include laying down, taking medications, and the application of heat or ice. PT. The pain is functionally and emotionally disabling for the patient as its preventing them from going about activities of daily living, such as routine housework. The pain does impair the patients ability to sleep.   Patient had bilateral thoracic radiofrequency T10, T11, T12 June 7, 2023 followed by L3/4 interlaminar epidural steroid injection on August 3, 2023 again by Dr. Son Segal  Patient attests to  6 weeks of provider directed treatment, including a provider directed stretching regimen,  Patient reports treatment that occurred within the last 3 months Patient report that symptoms have been persistent and and progressing after treatment    Reports there is NO present numbness, there is NO weakness. Patient denies any bowel/bladder incontinence, no saddle/perineal anesthesia or any other red flag signs or symptoms. Reports regular BMs.

## 2024-02-26 ENCOUNTER — RESULT REVIEW (OUTPATIENT)
Age: 79
End: 2024-02-26

## 2024-03-04 ENCOUNTER — APPOINTMENT (OUTPATIENT)
Dept: PAIN MANAGEMENT | Facility: CLINIC | Age: 79
End: 2024-03-04
Payer: MEDICARE

## 2024-03-04 VITALS
WEIGHT: 135 LBS | DIASTOLIC BLOOD PRESSURE: 60 MMHG | HEIGHT: 66 IN | HEART RATE: 72 BPM | SYSTOLIC BLOOD PRESSURE: 141 MMHG | BODY MASS INDEX: 21.69 KG/M2 | OXYGEN SATURATION: 95 %

## 2024-03-04 PROCEDURE — 99214 OFFICE O/P EST MOD 30 MIN: CPT

## 2024-03-04 RX ORDER — PREGABALIN 100 MG/1
100 CAPSULE ORAL
Qty: 90 | Refills: 2 | Status: ACTIVE | COMMUNITY
Start: 2024-03-04 | End: 1900-01-01

## 2024-03-04 NOTE — HISTORY OF PRESENT ILLNESS
[FreeTextEntry1] : Interval Note:   Ongoing low back and leg pain, into the top of the foot  Lyrica has yet to help  MRI completed for review  Moving bowels regularly. No recent falls.   Patient denies any bowel/bladder incontinence, no saddle/perineal anesthesia or any other red flag signs or symptoms.  ---     On February 1, 2024 patient underwent left L3-L4-L5 lumbar radiofrequency ablation 80% relief low back pain ---   left L3-L4-L5 lumbar diagnostic medial branch blocks December 21, 2023 with report of 80% relief.   Patient underwent left L3-L4-L5 diagnostic medial branch blocks on November 16, 2023 with 80% relief x 3 days ---  On October 11, 2023 patient underwent left L4 and L5 lumbar transforaminal epidural steroid injection with 80% relief of leg pain, but still has left sided back and hip pain  -- HPI - Mr. MEETA HAMM is a 78 year M with PHx as below, referred by FRIENDS  who presents today with chief complaints of consistent back pain that began 5 months ago. The pain is located in the lower back and referred to the left calf. The current, average, and minimum pain levels are all rated at 5/10, with a maximum intensity reaching 8/10. The pain is described as aching, throbbing, and shooting in nature. Activities that exacerbate the pain include sitting, standing, transitioning, bending, and lifting. Relief measures include laying down, taking medications, and the application of heat or ice. PT. The pain is functionally and emotionally disabling for the patient as its preventing them from going about activities of daily living, such as routine housework. The pain does impair the patients ability to sleep.   Patient had bilateral thoracic radiofrequency T10, T11, T12 June 7, 2023 followed by L3/4 interlaminar epidural steroid injection on August 3, 2023 again by Dr. Son Segal  Patient attests to  6 weeks of provider directed treatment, including a provider directed stretching regimen,  Patient reports treatment that occurred within the last 3 months Patient report that symptoms have been persistent and and progressing after treatment    Reports there is NO present numbness, there is NO weakness. Patient denies any bowel/bladder incontinence, no saddle/perineal anesthesia or any other red flag signs or symptoms. Reports regular BMs.

## 2024-03-04 NOTE — DATA REVIEWED
[FreeTextEntry1] :  MRI LUMBAR SPINE  ORDER #: [id number] CC: ; ; ; End of cc's  CLINICAL INFORMATION: Left-sided low back and leg pain, lumbar radiculopathy  ADDITIONAL CLINICAL INFORMATION: Scoliosis M41.9  TECHNIQUE: Multiplanar, multisequence MRI was performed of the lumbar spine. IV Contrast: NONE  PRIOR STUDIES: No priors available for comparison.  FINDINGS:  LOCALIZER: No additional findings. BONES: There is no fracture or bone marrow edema. ALIGNMENT: There is mild dextrocurvature of the lumbar spine and levocurvature of the thoracic spine. There is trace retrolisthesis at L1-L2, L2-L3 and L3-L4. SACROILIAC JOINTS/SACRUM: There is no sacral fracture. The SI joints are partially visualized but are intact. CONUS AND CAUDA EQUINA: The distal cord and conus are normal in signal. Conus terminates at L1. VISUALIZED INTRAPELVIC/INTRA-ABDOMINAL SOFT TISSUES: Normal. PARASPINAL SOFT TISSUES: Normal.   INDIVIDUAL LEVELS:  LOWER THORACIC SPINE: No spinal canal or neuroforaminal stenosis.  L1-L2: Broad-based posterior disc bulge and mild bilateral facet arthropathy result in no significant central canal or neural foraminal narrowing. L2-L3: Broad-based posterior disc bulge and moderate bilateral facet arthropathy result in mild to moderate right neural foraminal narrowing, moderate left neural foraminal narrowing but no significant central canal narrowing. There is right lateral recess stenosis. L3-L4: Broad-based posterior disc bulge and moderate bilateral facet arthropathy result in moderate right neural foraminal narrowing, moderate to severe left neural foraminal narrowing and mild central canal narrowing. There is probable left lateral recess stenosis. L4-L5: Broad-based posterior disc bulge with superimposed small left intraforaminal protrusion in conjunction with moderate bilateral facet arthropathy results in moderate left neural foraminal narrowing, mild right neural foraminal narrowing but no significant central canal narrowing. L5-S1: Broad-based posterior disc osteophyte complex and moderate bilateral facet arthropathy result in mild left neural foraminal narrowing but no significant right neural foraminal or central canal narrowing.   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 Additional varying degrees of central canal and neural foraminal narrowing, as above.

## 2024-03-21 ENCOUNTER — TRANSCRIPTION ENCOUNTER (OUTPATIENT)
Age: 79
End: 2024-03-21

## 2024-03-21 ENCOUNTER — APPOINTMENT (OUTPATIENT)
Dept: PAIN MANAGEMENT | Facility: HOSPITAL | Age: 79
End: 2024-03-21
Payer: MEDICARE

## 2024-03-21 PROCEDURE — 64483 NJX AA&/STRD TFRM EPI L/S 1: CPT | Mod: LT

## 2024-03-21 PROCEDURE — 64484 NJX AA&/STRD TFRM EPI L/S EA: CPT | Mod: LT

## 2024-03-27 NOTE — PHYSICAL EXAM
[General Appearance - In No Acute Distress] : in no acute distress [General Appearance - Alert] : alert [Fluency] : fluency intact [Comprehension] : comprehension intact [Cranial Nerves Oculomotor (III)] : extraocular motion intact [Cranial Nerves Optic (II)] : visual acuity intact bilaterally,  pupils equal round and reactive to light [Cranial Nerves Facial (VII)] : face symmetrical [Cranial Nerves Trigeminal (V)] : facial sensation intact symmetrically [Cranial Nerves Accessory (XI - Cranial And Spinal)] : head turning and shoulder shrug symmetric [Cranial Nerves Vestibulocochlear (VIII)] : hearing was intact bilaterally [Cranial Nerves Glossopharyngeal (IX)] : tongue and palate midline [Cranial Nerves Hypoglossal (XII)] : there was no tongue deviation with protrusion [Sensation Tactile Decrease] : light touch was intact [Motor Strength] : muscle strength was normal in all four extremities [2+] : Ankle jerk right 2+

## 2024-03-29 ENCOUNTER — APPOINTMENT (OUTPATIENT)
Dept: NEUROSURGERY | Facility: CLINIC | Age: 79
End: 2024-03-29
Payer: MEDICARE

## 2024-03-29 VITALS
OXYGEN SATURATION: 95 % | BODY MASS INDEX: 21.69 KG/M2 | HEIGHT: 66 IN | SYSTOLIC BLOOD PRESSURE: 119 MMHG | WEIGHT: 135 LBS | HEART RATE: 79 BPM | DIASTOLIC BLOOD PRESSURE: 67 MMHG

## 2024-03-29 DIAGNOSIS — G20.A1 PARKINSON'S DISEASE WITHOUT DYSKINESIA, WITHOUT MENTION OF FLUCTUATIONS: ICD-10-CM

## 2024-03-29 DIAGNOSIS — M48.062 SPINAL STENOSIS, LUMBAR REGION WITH NEUROGENIC CLAUDICATION: ICD-10-CM

## 2024-03-29 PROCEDURE — 99205 OFFICE O/P NEW HI 60 MIN: CPT

## 2024-04-01 PROBLEM — M48.062 NEUROGENIC CLAUDICATION DUE TO LUMBAR SPINAL STENOSIS: Status: ACTIVE | Noted: 2024-04-01

## 2024-04-01 PROBLEM — G20.A1 PARKINSON'S DISEASE: Status: ACTIVE | Noted: 2021-06-16

## 2024-04-01 NOTE — CONSULT LETTER
[Dear  ___] : Dear  [unfilled], [Consult Letter:] : I had the pleasure of evaluating your patient, [unfilled]. [Please see my note below.] : Please see my note below. [Consult Closing:] : Thank you very much for allowing me to participate in the care of this patient.  If you have any questions, please do not hesitate to contact me. [Sincerely,] : Sincerely, [DrChristine  ___] : Dr. GALDAMEZ [DrChristine ___] : Dr. GALDAMEZ [FreeTextEntry3] : Demarcus Barriga M.D.

## 2024-04-01 NOTE — DATA REVIEWED
[de-identified] : 2/27/24 PROCEDURE:            MRI LUMBAR SPINE  ORDER #:              [id number] CC:                                         ;                     ;                     ; End of cc's  CLINICAL INFORMATION: Left-sided low back and leg pain, lumbar radiculopathy  ADDITIONAL CLINICAL INFORMATION: Scoliosis M41.9  TECHNIQUE: Multiplanar, multisequence MRI was performed of the lumbar spine. IV Contrast: NONE  PRIOR STUDIES: No priors available for comparison.  FINDINGS:  LOCALIZER: No additional findings. BONES: There is no fracture or bone marrow edema. ALIGNMENT: There is mild dextrocurvature of the lumbar spine and levocurvature of the thoracic spine. There is trace retrolisthesis at L1-L2, L2-L3 and L3-L4. SACROILIAC JOINTS/SACRUM: There is no sacral fracture. The SI joints are partially visualized but are intact. CONUS AND CAUDA EQUINA: The distal cord and conus are normal in signal. Conus terminates at L1. VISUALIZED INTRAPELVIC/INTRA-ABDOMINAL SOFT TISSUES: Normal. PARASPINAL SOFT TISSUES: Normal.   INDIVIDUAL LEVELS:  LOWER THORACIC SPINE: No spinal canal or neuroforaminal stenosis.  L1-L2: Broad-based posterior disc bulge and mild bilateral facet arthropathy result in no significant central canal or neural foraminal narrowing. L2-L3: Broad-based posterior disc bulge and moderate bilateral facet arthropathy result in mild to moderate right neural foraminal narrowing, moderate left neural foraminal narrowing but no significant central canal narrowing. There is right lateral recess stenosis. L3-L4: Broad-based posterior disc bulge and moderate bilateral facet arthropathy result in moderate right neural foraminal narrowing, moderate to severe left neural foraminal narrowing and mild central canal narrowing. There is probable left lateral recess stenosis. L4-L5: Broad-based posterior disc bulge with superimposed small left intraforaminal protrusion in conjunction with moderate bilateral facet arthropathy results in moderate left neural foraminal narrowing, mild right neural foraminal narrowing but no significant central canal narrowing. L5-S1: Broad-based posterior disc osteophyte complex and moderate bilateral facet arthropathy result in mild left neural foraminal narrowing but no significant right neural foraminal or central canal narrowing.   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 Additional varying degrees of central canal and neural foraminal narrowing, as above.

## 2024-04-01 NOTE — ASSESSMENT
[FreeTextEntry1] : MEETA HAMM is a 78 year old male with a PMH of GERD, MVP, Parkinson's disease, small bowel obstruction, ulcerative colitis, presenting due to lumbar radiculopathy. He has failed multiple VICTORIA injections but found good relief in his latest VICTORIA. He is seeing PT for the past two weeks with some noticeable benefit.   I reviewed his MRI lumbar spine using the spine model to aid in interpretation.  There is evidence of thoracolumbar S-shaped scoliosis.  He also has multilevel lumbar degenerative spondylosis most significant at L3-4 where there is moderate central canal narrowing as well as severe bilateral neuroforaminal stenosis and lateral recess stenosis at that level.  I have discussed the natural history and treatment options for neurogenic claudication and lumbar radiculopathy due to lumbar spinal stenosis with the patient. I explained the indications for observation, conservative management, medical management, physical therapy, pain management approaches and surgery. I explained the different types and surgical approaches including anterior and posterior approaches as well as decompression only procedures and instrumented fusions.  With all that being said, we are optimistic that his last injection gave him significant relief in his pain which would allow him to work more with physical therapy.  In the end, I recommend additional imaging in the form of CT lumbar spine to better visualize the extent of osteophyte complex development and facet arthropathy, as well as upright lumbar spine X-rays with flexion and extension views to demonstrate the regional spinal alignment and to rule out any visible dynamic instability as well as full standing scoliosis x-rays. The patient may return to the office once imaging completed for further treatment planning. He was also advised to reach out to us sooner if he is to develop weakness or bowel/bladder symptoms.  I spent 60 minutes relative to this patient encounter.

## 2024-04-01 NOTE — HISTORY OF PRESENT ILLNESS
[de-identified] : Accompanied by his wife Darinel HAMM is a 78 year old male with a PMH of GERD, Parkinson's disease diagnosed 5 years ago (Dr. Ibarra), mitral valve repair (Dr. Vance), 2017 infection in spine at [facility] was on antibiotics for one year, who presents to the office today at the request of Dr. Sorenson for neurosurgical consultation due to lumbar radiculopathy and claudication.  The pain is characterized as aching, throbbing, shotting in nature.  It started one year ago. It is exacerbated by sitting prolonged, bending, transitioning, lifting and relieved by medications, movement, sleeping, rest, heat, and ice. Better in morning. The pain worsens depending on activity level.  It radiates from the low back to the left buttock to posterior left calf. At some point had mid low back pain. There has been no known trauma precipitating the pain.  The patient denies numbness of extremities. Notes weakness of left lower extremity. Uses cane intermittently and more since knee replacement. Denies bowel or bladder incontinence and gait disturbance.  He has tried VICTORIA last VICTORIA one week ago helped next day, (Left L4, L5 VICTORIA 10/11/23, Left L3, L4, L5 MBB 11/16/23, Left L3, L4, L5 12/21/23, Left L3, L4, L5 RFA 2/1, Left L4 and L4 VICTORIA 3/21/24, PT, and pain medications including Lyrica, Gabapentin, Naproxen (helps limits use), Tylenol in the last few months without relief. Doing PT for two weeks. He has undergone imaging in the form of MRI lumbar spine which revealed multilevel degenerative degenerative disease and facet arthropathy of the lumbar spine, most pronounced at L3-L4 where there is mild central canal narrowing, moderate to severe left neural foraminal narrowing and moderate right neural foraminal narrowing. Additional varying degrees of central canal and neural foraminal narrowing, as above (see detailed report below).  Surg Hx:  hand surgery, inguinal hernia repair, prostatectomy, small bowel resection, total knee replacement, turbinectomy Meds:  Trilegy, Alfuzosin, Avodart, calcium, pregabalin, sinemet, theralith, Vitamin B12  Allergies: Keflex, squash  Soc Hx: Former smoker quit 1976 smoked less than one PPD for 12 years, no EtOH, lives with wife, retired since 2003

## 2024-04-02 ENCOUNTER — RESULT REVIEW (OUTPATIENT)
Age: 79
End: 2024-04-02

## 2024-04-05 ENCOUNTER — APPOINTMENT (OUTPATIENT)
Dept: PAIN MANAGEMENT | Facility: CLINIC | Age: 79
End: 2024-04-05
Payer: MEDICARE

## 2024-04-05 VITALS
WEIGHT: 135 LBS | DIASTOLIC BLOOD PRESSURE: 72 MMHG | HEART RATE: 71 BPM | SYSTOLIC BLOOD PRESSURE: 137 MMHG | BODY MASS INDEX: 21.69 KG/M2 | HEIGHT: 66 IN | OXYGEN SATURATION: 95 %

## 2024-04-05 PROCEDURE — 99213 OFFICE O/P EST LOW 20 MIN: CPT

## 2024-04-05 NOTE — HISTORY OF PRESENT ILLNESS
[Back Pain] : back pain [5] : a current pain level of 5/10 [FreeTextEntry1] : Interval Note:   Ongoing LEFT sided low back pain  Lyrica - DC'ed  Read on PNS and is still considering  Had a series of films CT/ Xrays following visit Dr martinez  Moving bowels regularly. No recent falls.   Patient denies any bowel/bladder incontinence, no saddle/perineal anesthesia or any other red flag signs or symptoms.  ---     On February 1, 2024 patient underwent left L3-L4-L5 lumbar radiofrequency ablation 80% relief low back pain ---   left L3-L4-L5 lumbar diagnostic medial branch blocks December 21, 2023 with report of 80% relief.   Patient underwent left L3-L4-L5 diagnostic medial branch blocks on November 16, 2023 with 80% relief x 3 days ---  On October 11, 2023 patient underwent left L4 and L5 lumbar transforaminal epidural steroid injection with 80% relief of leg pain, but still has left sided back and hip pain  -- HPI - Mr. MEETA HAMM is a 78 year M with PHx as below, referred by FRIENDS  who presents today with chief complaints of consistent back pain that began 5 months ago. The pain is located in the lower back and referred to the left calf. The current, average, and minimum pain levels are all rated at 5/10, with a maximum intensity reaching 8/10. The pain is described as aching, throbbing, and shooting in nature. Activities that exacerbate the pain include sitting, standing, transitioning, bending, and lifting. Relief measures include laying down, taking medications, and the application of heat or ice. PT. The pain is functionally and emotionally disabling for the patient as its preventing them from going about activities of daily living, such as routine housework. The pain does impair the patients ability to sleep.   Patient had bilateral thoracic radiofrequency T10, T11, T12 June 7, 2023 followed by L3/4 interlaminar epidural steroid injection on August 3, 2023 again by Dr. Son Segal  Patient attests to  6 weeks of provider directed treatment, including a provider directed stretching regimen,  Patient reports treatment that occurred within the last 3 months Patient report that symptoms have been persistent and and progressing after treatment    Reports there is NO present numbness, there is NO weakness. Patient denies any bowel/bladder incontinence, no saddle/perineal anesthesia or any other red flag signs or symptoms. Reports regular BMs.

## 2024-04-05 NOTE — ASSESSMENT
[FreeTextEntry1] : Mr. MEETA HAMM is a 78 year M suffering from low back and left leg pain, that based upon today's subjective complaints, physical examination, and MRI review, is likely multifactorial with ongoing element of facet arthopathy and associated lumbar radiulopathy  >> Medications  Chronic opioid use for non-malignant pain, in particular at high doses would not be recommended since it can potentially lead to hyperalgesia (hypersensitivity), tolerance and addiction.  DC Lyrica  >> Interventions  LEFT SPRint PNS stimulator under fluoroscopy at L4/5  Dx Lumbar SPondylosis M47. 896 [place] [address]   > > Therapy and Other Modalities  CW PT  >> Imaging and Other Studies  I have personally reviewed the images in detail together with the patient today, and I have answered all questions regarding this condition to the best of my ability, to the patient's satisfaction.  >> Consults  None ordered.    Based upon current evaluation and management, I will be providing ongoing longitudinal care for this complex painful condition, described above. Patient is encouraged to contact me with any questions or concerns.

## 2024-04-05 NOTE — PHYSICAL EXAM
[de-identified] : General: AAOx3, NAD HEENT: EOMI, Sclera white CVS: +2 Pulses Pulmonary: No Respiratory Distress Abdomen: Soft, NT, ND MSK: Moving all extremities against gravity, Resting tremor Neuro: Sensation I to LT Extremities: (-)Edema Derm: No Rash Psych: Calm, Cooperative

## 2024-04-05 NOTE — DATA REVIEWED
[FreeTextEntry1] :  PROCEDURE: XR LS SPINE W BEND MIN 6 VIEWS   ORDER #: [id number] CC: ; ; ; End of cc's  Radiographs of the lumbar spine  CLINICAL INFORMATION: Injury with Pain.  TECHNIQUE: Lumbar spine frontal, lateral, lateral flexion and extension views, bilateral oblique views.  FINDINGS: No previous examinations are available for review. Advanced degenerative spondylosis. T12-L1, L1-L2 and L2-L3 intervertebral disc space narrowing with endplate anterior and lateral osteophytes. Degenerative narrowing of the L5-S1 disc space.  Lower thoracic upper lumbar levoscoliotic curve. Lower lumbar vertebral heights maintained. No gross fracture or vertebral subluxation.. The sacroiliac joints are intact.  IMPRESSION: Advanced degenerative spondylosis as described. Please see same day lumbar spine CT report. MRI LUMBAR SPINE  ORDER #:  [id number] CC: ; ; ; End of cc's  CLINICAL INFORMATION: Left-sided low back and leg pain, lumbar radiculopathy  ADDITIONAL CLINICAL INFORMATION: Scoliosis M41.9  TECHNIQUE: Multiplanar, multisequence MRI was performed of the lumbar spine. IV Contrast: NONE  PRIOR STUDIES: No priors available for comparison.  FINDINGS:  LOCALIZER: No additional findings. BONES: There is no fracture or bone marrow edema. ALIGNMENT: There is mild dextrocurvature of the lumbar spine and levocurvature of the thoracic spine. There is trace retrolisthesis at L1-L2, L2-L3 and L3-L4. SACROILIAC JOINTS/SACRUM: There is no sacral fracture. The SI joints are partially visualized but are intact. CONUS AND CAUDA EQUINA: The distal cord and conus are normal in signal. Conus terminates at L1. VISUALIZED INTRAPELVIC/INTRA-ABDOMINAL SOFT TISSUES: Normal. PARASPINAL SOFT TISSUES: Normal.   INDIVIDUAL LEVELS:  LOWER THORACIC SPINE: No spinal canal or neuroforaminal stenosis.  L1-L2: Broad-based posterior disc bulge and mild bilateral facet arthropathy result in no significant central canal or neural foraminal narrowing. L2-L3: Broad-based posterior disc bulge and moderate bilateral facet arthropathy result in mild to moderate right neural foraminal narrowing, moderate left neural foraminal narrowing but no significant central canal narrowing. There is right lateral recess stenosis. L3-L4: Broad-based posterior disc bulge and moderate bilateral facet arthropathy result in moderate right neural foraminal narrowing, moderate to severe left neural foraminal narrowing and mild central canal narrowing. There is probable left lateral recess stenosis. L4-L5: Broad-based posterior disc bulge with superimposed small left intraforaminal protrusion in conjunction with moderate bilateral facet arthropathy results in moderate left neural foraminal narrowing, mild right neural foraminal narrowing but no significant central canal narrowing. L5-S1: Broad-based posterior disc osteophyte complex and moderate bilateral facet arthropathy result in mild left neural foraminal narrowing but no significant right neural foraminal or central canal narrowing.   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 Additional varying degrees of central canal and neural foraminal narrowing, as above.

## 2024-04-23 ENCOUNTER — APPOINTMENT (OUTPATIENT)
Dept: NEUROSURGERY | Facility: CLINIC | Age: 79
End: 2024-04-23
Payer: MEDICARE

## 2024-04-23 VITALS
DIASTOLIC BLOOD PRESSURE: 69 MMHG | WEIGHT: 135 LBS | OXYGEN SATURATION: 94 % | HEART RATE: 74 BPM | BODY MASS INDEX: 21.69 KG/M2 | HEIGHT: 66 IN | SYSTOLIC BLOOD PRESSURE: 114 MMHG

## 2024-04-23 DIAGNOSIS — M54.16 RADICULOPATHY, LUMBAR REGION: ICD-10-CM

## 2024-04-23 PROCEDURE — 99215 OFFICE O/P EST HI 40 MIN: CPT

## 2024-04-23 NOTE — HISTORY OF PRESENT ILLNESS
[de-identified] : Meeta Hamm presents for interval follow up for imaging review and progress check for lumbar radiculopathy and claudication. He has a left SPRINT PNS stimulator scheduled for 4/25. VICTORIA helped for two weeks. He continues to have left gluteal pain that is worse with standing. Tylenol provided mild relief. He completed PT.  He underwent CT lumbar spine which revealed Moderate multilevel lumbar spondylosis with multilevel spinal canal and foraminal narrowing.  He underwent x-rays which revealed advanced degenerative spondylosis.  3/29/24: Accompanied by his wife Mireya.  MEETA HAMM is a 78 year old male with a PMH of GERD, Parkinson's disease diagnosed 5 years ago (Dr. Ibarra), mitral valve repair (Dr. Vance), 2017 infection in spine at [facility] was on antibiotics for one year, who presents to the office today at the request of Dr. Sorenson for neurosurgical consultation due to lumbar radiculopathy and claudication.  The pain is characterized as aching, throbbing, shotting in nature.  It started one year ago. It is exacerbated by sitting prolonged, bending, transitioning, lifting and relieved by medications, movement, sleeping, rest, heat, and ice. Better in morning. The pain worsens depending on activity level.  It radiates from the low back to the left buttock to posterior left calf. At some point had mid low back pain. There has been no known trauma precipitating the pain.  The patient denies numbness of extremities. Notes weakness of left lower extremity. Uses cane intermittently and more since knee replacement. Denies bowel or bladder incontinence and gait disturbance.  He has tried VICTORIA last VICTORIA one week ago helped next day, (Left L4, L5 VICTORIA 10/11/23, Left L3, L4, L5 MBB 11/16/23, Left L3, L4, L5 12/21/23, Left L3, L4, L5 RFA 2/1, Left L4 and L4 VICTORIA 3/21/24, PT, and pain medications including Lyrica, Gabapentin, Naproxen (helps limits use), Tylenol in the last few months without relief. Doing PT for two weeks. He has undergone imaging in the form of MRI lumbar spine which revealed multilevel degenerative degenerative disease and facet arthropathy of the lumbar spine, most pronounced at L3-L4 where there is mild central canal narrowing, moderate to severe left neural foraminal narrowing and moderate right neural foraminal narrowing. Additional varying degrees of central canal and neural foraminal narrowing, as above (see detailed report below).  Surg Hx:  hand surgery, inguinal hernia repair, prostatectomy, small bowel resection, total knee replacement, turbinectomy Meds:  Trilegy, Alfuzosin, Avodart, calcium, pregabalin, sinemet, theralith, Vitamin B12  Allergies: Keflex, squash  Soc Hx: Former smoker quit 1976 smoked less than one PPD for 12 years, no EtOH, lives with wife, retired since 2003

## 2024-04-23 NOTE — ASSESSMENT
[FreeTextEntry1] : MEETA HAMM is a 78 year old male with a PMH of GERD, MVP, Parkinson's disease, small bowel obstruction, ulcerative colitis, presenting due to lumbar radiculopathy. He has failed multiple VICTORIA injections but found good relief in his latest VICTORIA. He has been seeing PT with some noticeable benefit which is now completed and has a SPRINT PNS stimulator scheduled.   I reviewed CT and xray of the lumbar spine in the office today. They redemonstrate multilevel lumbar spondylosis without any dynamic instability. Scoliosis X-rays best demonstrate thoracolumbar scoliosis as well as increased curvature in the upright position when compared to supine imaging.   Previously and again today, I reviewed his MRI lumbar spine using the spine model to aid in interpretation.  There is evidence of thoracolumbar S-shaped scoliosis.  He also has multilevel lumbar degenerative spondylosis most significant at L3-4 where there is moderate central canal narrowing as well as severe bilateral neuroforaminal stenosis and lateral recess stenosis at that level.  I have discussed the natural history and treatment options for lumbar radiculopathy due to lumbar spinal stenosis with the patient. I explained the indications for observation, conservative management, medical management, physical therapy, pain management approaches and surgery. I explained the different types and surgical approaches including anterior and posterior approaches as well as decompression only procedures and instrumented fusions. He is scheduled for a left sprint SNS stimulator with Feliciano Sorenson, which would be a great next step in his management.   In the end, I recommend conservative management in the form of physical therapy and pain management.  The patient may return to the office as needed after the PNS stimulator. The patient understands the plan of care and is in agreement.  All questions answered to patient satisfaction.  He was also advised to reach out to us sooner if he is to develop weakness or bowel/bladder symptoms.  I spent 45 minutes relative to this patient encounter.

## 2024-04-23 NOTE — DATA REVIEWED
[de-identified] : 4/3/24  CT LUMBAR SPINE  CLINICAL INFORMATION: LUMBAR RADICULOPATHY / LUMBAR SPONDYLOSIS TECHNIQUE: CT LUMBAR SPINE COMPARISON: Lumbar spine MRI 2/27/2024  FINDINGS:  DISC LEVEL EVALUATION: Disc herniations of thoracic spine with osseous ridging.  L1/L2: Mild retrolisthesis. Diffuse disc bulge with osseous ridging. Bilateral facet arthrosis. No spinal canal stenosis. Mild left greater than right foraminal narrowing. L2/L3: Mild retrolisthesis. Diffuse disc bulge with osseous ridging. Bilateral facet arthrosis. No spinal canal stenosis. Mild to moderate bilateral foraminal narrowing. Narrowing of the right lateral recess. L3/L4: Diffuse disc bulge with osseous ridging. Mild left greater than right facet arthrosis. Mild to moderate spinal canal stenosis. Left greater than foraminal narrowing. L4/L5: Mild disc bulge. Bulky bilateral facet arthrosis. Mild spinal canal stenosis. Bilateral foraminal narrowing. L5/S1: Mild disc bulge with osseous ridging. Bulky left greater than right facet arthrosis. No spinal canal stenosis. Left greater than right foraminal narrowing.  SPINAL ALIGNMENT: Broad levocurvature. Mild retrolisthesis of L1 on L2, L2 on L3 and L3 on L4. SI JOINTS: Normal. BONES: No fracture. DISCS: Multilevel loss of disc heights with vacuum phenomenon. PARASPINAL MUSCLE AND SOFT TISSUES: Lower lumbar paraspinal muscle fatty atrophy INTRAABDOMINAL/INTRAPELVIC SOFT TISSUES: Vascular calcifications. Likely left hepatic lobe cyst., [place] left mid kidney calculus.  IMPRESSION:  Moderate multilevel lumbar spondylosis with multilevel spinal canal and foraminal narrowing. [de-identified] : 2/27/24 PROCEDURE:            MRI LUMBAR SPINE  ORDER #:              [id number] CC:                                         ;                     ;                     ; End of cc's  CLINICAL INFORMATION: Left-sided low back and leg pain, lumbar radiculopathy  ADDITIONAL CLINICAL INFORMATION: Scoliosis M41.9  TECHNIQUE: Multiplanar, multisequence MRI was performed of the lumbar spine. IV Contrast: NONE  PRIOR STUDIES: No priors available for comparison.  FINDINGS:  LOCALIZER: No additional findings. BONES: There is no fracture or bone marrow edema. ALIGNMENT: There is mild dextrocurvature of the lumbar spine and levocurvature of the thoracic spine. There is trace retrolisthesis at L1-L2, L2-L3 and L3-L4. SACROILIAC JOINTS/SACRUM: There is no sacral fracture. The SI joints are partially visualized but are intact. CONUS AND CAUDA EQUINA: The distal cord and conus are normal in signal. Conus terminates at L1. VISUALIZED INTRAPELVIC/INTRA-ABDOMINAL SOFT TISSUES: Normal. PARASPINAL SOFT TISSUES: Normal.   INDIVIDUAL LEVELS:  LOWER THORACIC SPINE: No spinal canal or neuroforaminal stenosis.  L1-L2: Broad-based posterior disc bulge and mild bilateral facet arthropathy result in no significant central canal or neural foraminal narrowing. L2-L3: Broad-based posterior disc bulge and moderate bilateral facet arthropathy result in mild to moderate right neural foraminal narrowing, moderate left neural foraminal narrowing but no significant central canal narrowing. There is right lateral recess stenosis. L3-L4: Broad-based posterior disc bulge and moderate bilateral facet arthropathy result in moderate right neural foraminal narrowing, moderate to severe left neural foraminal narrowing and mild central canal narrowing. There is probable left lateral recess stenosis. L4-L5: Broad-based posterior disc bulge with superimposed small left intraforaminal protrusion in conjunction with moderate bilateral facet arthropathy results in moderate left neural foraminal narrowing, mild right neural foraminal narrowing but no significant central canal narrowing. L5-S1: Broad-based posterior disc osteophyte complex and moderate bilateral facet arthropathy result in mild left neural foraminal narrowing but no significant right neural foraminal or central canal narrowing.   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 Additional varying degrees of central canal and neural foraminal narrowing, as above.

## 2024-04-25 ENCOUNTER — APPOINTMENT (OUTPATIENT)
Dept: PAIN MANAGEMENT | Facility: HOSPITAL | Age: 79
End: 2024-04-25

## 2024-04-25 ENCOUNTER — TRANSCRIPTION ENCOUNTER (OUTPATIENT)
Age: 79
End: 2024-04-25

## 2024-05-03 ENCOUNTER — APPOINTMENT (OUTPATIENT)
Dept: PAIN MANAGEMENT | Facility: CLINIC | Age: 79
End: 2024-05-03
Payer: MEDICARE

## 2024-05-03 VITALS
BODY MASS INDEX: 21.69 KG/M2 | HEART RATE: 65 BPM | DIASTOLIC BLOOD PRESSURE: 65 MMHG | SYSTOLIC BLOOD PRESSURE: 138 MMHG | HEIGHT: 66 IN | WEIGHT: 135 LBS | OXYGEN SATURATION: 95 %

## 2024-05-03 PROCEDURE — 99213 OFFICE O/P EST LOW 20 MIN: CPT

## 2024-05-03 NOTE — ASSESSMENT
[FreeTextEntry1] : Mr. MEETA HAMM is a 78 year M suffering from low back and left leg pain, that based upon today's subjective complaints, physical examination, and MRI review, is likely multifactorial with ongoing element of facet arthopathy and associated lumbar radiulopathy  >> Medications  Chronic opioid use for non-malignant pain, in particular at high doses would not be recommended since it can potentially lead to hyperalgesia (hypersensitivity), tolerance and addiction.  DC Lyrica  >> Interventions  Responding well to peripheral nerve stimulator   > > Therapy and Other Modalities  CW PT  >> Imaging and Other Studies  I have personally reviewed the images in detail together with the patient today, and I have answered all questions regarding this condition to the best of my ability, to the patient's satisfaction.  >> Consults  None ordered.    Based upon current evaluation and management, I will be providing ongoing longitudinal care for this complex painful condition, described above. Patient is encouraged to contact me with any questions or concerns.

## 2024-05-03 NOTE — PHYSICAL EXAM
[de-identified] : General: AAOx3, NAD HEENT: EOMI, Sclera white CVS: +2 Pulses Pulmonary: No Respiratory Distress Abdomen: Soft, NT, ND MSK: Moving all extremities against gravity, Resting tremor Neuro: Sensation I to LT Extremities: (-)Edema Derm: No Rash, PNS site C/D/I Psych: Calm, Cooperative

## 2024-05-03 NOTE — HISTORY OF PRESENT ILLNESS
[FreeTextEntry1] : Interval Note:   On April 25, 2024 patient underwent left L4-L5 peripheral nerve stimulator and has been reporting significant relief thus far, particularly the left sided low back pain  Moving bowels regularly. No recent falls.   Patient denies any bowel/bladder incontinence, no saddle/perineal anesthesia or any other red flag signs or symptoms.  ---  On February 1, 2024 patient underwent left L3-L4-L5 lumbar radiofrequency ablation 80% relief low back pain ---   left L3-L4-L5 lumbar diagnostic medial branch blocks December 21, 2023 with report of 80% relief.   Patient underwent left L3-L4-L5 diagnostic medial branch blocks on November 16, 2023 with 80% relief x 3 days ---  On October 11, 2023 patient underwent left L4 and L5 lumbar transforaminal epidural steroid injection with 80% relief of leg pain, but still has left sided back and hip pain  -- HPI - Mr. MEETA HAMM is a 78 year M with PHx as below, referred by FRIENDS  who presents today with chief complaints of consistent back pain that began 5 months ago. The pain is located in the lower back and referred to the left calf. The current, average, and minimum pain levels are all rated at 5/10, with a maximum intensity reaching 8/10. The pain is described as aching, throbbing, and shooting in nature. Activities that exacerbate the pain include sitting, standing, transitioning, bending, and lifting. Relief measures include laying down, taking medications, and the application of heat or ice. PT. The pain is functionally and emotionally disabling for the patient as its preventing them from going about activities of daily living, such as routine housework. The pain does impair the patients ability to sleep.   Patient had bilateral thoracic radiofrequency T10, T11, T12 June 7, 2023 followed by L3/4 interlaminar epidural steroid injection on August 3, 2023 again by Dr. Son Segal  Patient attests to  6 weeks of provider directed treatment, including a provider directed stretching regimen,  Patient reports treatment that occurred within the last 3 months Patient report that symptoms have been persistent and and progressing after treatment    Reports there is NO present numbness, there is NO weakness. Patient denies any bowel/bladder incontinence, no saddle/perineal anesthesia or any other red flag signs or symptoms. Reports regular BMs.

## 2024-05-10 ENCOUNTER — APPOINTMENT (OUTPATIENT)
Dept: PAIN MANAGEMENT | Facility: CLINIC | Age: 79
End: 2024-05-10
Payer: MEDICARE

## 2024-05-10 PROCEDURE — 99212 OFFICE O/P EST SF 10 MIN: CPT

## 2024-05-10 NOTE — HISTORY OF PRESENT ILLNESS
[FreeTextEntry1] : Interval Note:   On April 25, 2024 patient underwent left L4-L5 peripheral nerve stimulator and has been reporting significant relief thus far, particularly the left sided low back pain  Ongoing relief  Moving bowels regularly. No recent falls.   Patient denies any bowel/bladder incontinence, no saddle/perineal anesthesia or any other red flag signs or symptoms.  ---  On February 1, 2024 patient underwent left L3-L4-L5 lumbar radiofrequency ablation 80% relief low back pain ---   left L3-L4-L5 lumbar diagnostic medial branch blocks December 21, 2023 with report of 80% relief.   Patient underwent left L3-L4-L5 diagnostic medial branch blocks on November 16, 2023 with 80% relief x 3 days ---  On October 11, 2023 patient underwent left L4 and L5 lumbar transforaminal epidural steroid injection with 80% relief of leg pain, but still has left sided back and hip pain  -- HPI - Mr. MEETA HAMM is a 78 year M with PHx as below, referred by FRIENDS  who presents today with chief complaints of consistent back pain that began 5 months ago. The pain is located in the lower back and referred to the left calf. The current, average, and minimum pain levels are all rated at 5/10, with a maximum intensity reaching 8/10. The pain is described as aching, throbbing, and shooting in nature. Activities that exacerbate the pain include sitting, standing, transitioning, bending, and lifting. Relief measures include laying down, taking medications, and the application of heat or ice. PT. The pain is functionally and emotionally disabling for the patient as its preventing them from going about activities of daily living, such as routine housework. The pain does impair the patients ability to sleep.   Patient had bilateral thoracic radiofrequency T10, T11, T12 June 7, 2023 followed by L3/4 interlaminar epidural steroid injection on August 3, 2023 again by Dr. Son Segal  Patient attests to  6 weeks of provider directed treatment, including a provider directed stretching regimen,  Patient reports treatment that occurred within the last 3 months Patient report that symptoms have been persistent and and progressing after treatment    Reports there is NO present numbness, there is NO weakness. Patient denies any bowel/bladder incontinence, no saddle/perineal anesthesia or any other red flag signs or symptoms. Reports regular BMs.

## 2024-05-10 NOTE — REASON FOR VISIT
[Home] : at home, [unfilled] , at the time of the visit. [Medical Office: ([facility])___] : at the medical office located in  [Patient] : the patient [Follow-Up Visit] : a follow-up pain management visit

## 2024-05-10 NOTE — PHYSICAL EXAM
[de-identified] : Physical Exam (Telemedicine):  Gen: AAOX3, NAD HEENT: PERRLA, EOMI, NCAT, NP Pulmonary: No Signs of Respiratory Distress MSK: AROM WFL, Limitations none Neurological: Grossly neurologically intact, Noted deficits none Resting tremor++ Gait: Normal, Non-antalgic, Sans AD Derm: No Rash, (-)Lesions, (-)Erythema, (-)Cyanosis  Psych: Calm, Cooperative, Conversational  Disclaimer: This is a limited examination for the purposes of conducting a telemedicine visit during the COVID-19 pandemic. Physical exam maneuvers were modified as necessary to allow patient to self perform. A focused physician physical examination will be performed during in person visits and should be referred to to determine need for further testing, interventions or degree of disability.

## 2024-05-10 NOTE — ASSESSMENT
[FreeTextEntry1] : Mr. MEETA HAMM is a 78 year M suffering from low back and left leg pain, that based upon today's subjective complaints, physical examination, and MRI review, is likely multifactorial with ongoing element of facet arthopathy and associated lumbar radiulopathy  >> Medications  Chronic opioid use for non-malignant pain, in particular at high doses would not be recommended since it can potentially lead to hyperalgesia (hypersensitivity), tolerance and addiction.  DC Lyrica  >> Interventions  Good relief so far with PNS   > > Therapy and Other Modalities  CW PT  >> Imaging and Other Studies  I have personally reviewed the images in detail together with the patient today, and I have answered all questions regarding this condition to the best of my ability, to the patient's satisfaction.  >> Consults  None ordered.    Based upon current evaluation and management, I will be providing ongoing longitudinal care for this complex painful condition, described above. Patient is encouraged to contact me with any questions or concerns.

## 2024-05-24 ENCOUNTER — APPOINTMENT (OUTPATIENT)
Dept: PAIN MANAGEMENT | Facility: CLINIC | Age: 79
End: 2024-05-24
Payer: MEDICARE

## 2024-05-24 VITALS
HEIGHT: 66 IN | DIASTOLIC BLOOD PRESSURE: 72 MMHG | SYSTOLIC BLOOD PRESSURE: 125 MMHG | WEIGHT: 135 LBS | HEART RATE: 76 BPM | BODY MASS INDEX: 21.69 KG/M2 | OXYGEN SATURATION: 91 %

## 2024-05-24 PROCEDURE — 99213 OFFICE O/P EST LOW 20 MIN: CPT

## 2024-05-24 NOTE — ASSESSMENT
[FreeTextEntry1] : Mr. MEETA HAMM is a 78 year M suffering from low back and left leg pain, that based upon today's subjective complaints, physical examination, and MRI review, is likely multifactorial with ongoing element of facet arthopathy and associated lumbar radiulopathy  >> Medications  Chronic opioid use for non-malignant pain, in particular at high doses would not be recommended since it can potentially lead to hyperalgesia (hypersensitivity), tolerance and addiction.  DC Lyrica  >> Interventions  Dressing materials provided  > > Therapy and Other Modalities  CW PT  >> Imaging and Other Studies  I have personally reviewed the images in detail together with the patient today, and I have answered all questions regarding this condition to the best of my ability, to the patient's satisfaction.  >> Consults  None ordered.    Based upon current evaluation and management, I will be providing ongoing longitudinal care for this complex painful condition, described above. Patient is encouraged to contact me with any questions or concerns.

## 2024-05-24 NOTE — HISTORY OF PRESENT ILLNESS
[FreeTextEntry1] : Interval Note:   Doing well 1 month into PNS procedure Able to garden, improved ability to go abotu ADLs Moving bowels regularly. No recent falls.   Patient denies any bowel/bladder incontinence, no saddle/perineal anesthesia or any other red flag signs or symptoms.  ---  On April 25, 2024 patient underwent left L4-L5 peripheral nerve stimulator and has been reporting significant relief thus far, particularly the left sided low back pain   On February 1, 2024 patient underwent left L3-L4-L5 lumbar radiofrequency ablation 80% relief low back pain ---   left L3-L4-L5 lumbar diagnostic medial branch blocks December 21, 2023 with report of 80% relief.   Patient underwent left L3-L4-L5 diagnostic medial branch blocks on November 16, 2023 with 80% relief x 3 days ---  On October 11, 2023 patient underwent left L4 and L5 lumbar transforaminal epidural steroid injection with 80% relief of leg pain, but still has left sided back and hip pain  -- HPI - Mr. MEETA HAMM is a 78 year M with PHx as below, referred by FRIENDS  who presents today with chief complaints of consistent back pain that began 5 months ago. The pain is located in the lower back and referred to the left calf. The current, average, and minimum pain levels are all rated at 5/10, with a maximum intensity reaching 8/10. The pain is described as aching, throbbing, and shooting in nature. Activities that exacerbate the pain include sitting, standing, transitioning, bending, and lifting. Relief measures include laying down, taking medications, and the application of heat or ice. PT. The pain is functionally and emotionally disabling for the patient as its preventing them from going about activities of daily living, such as routine housework. The pain does impair the patients ability to sleep.   Patient had bilateral thoracic radiofrequency T10, T11, T12 June 7, 2023 followed by L3/4 interlaminar epidural steroid injection on August 3, 2023 again by Dr. Son Segal  Patient attests to  6 weeks of provider directed treatment, including a provider directed stretching regimen,  Patient reports treatment that occurred within the last 3 months Patient report that symptoms have been persistent and and progressing after treatment    Reports there is NO present numbness, there is NO weakness. Patient denies any bowel/bladder incontinence, no saddle/perineal anesthesia or any other red flag signs or symptoms. Reports regular BMs.

## 2024-05-24 NOTE — REVIEW OF SYSTEMS
[Back Pain] : back pain [Muscle Pain] : muscle pain [Joint Stiffness] : joint stiffness [Decreased ROM] : decreased range of motion

## 2024-05-24 NOTE — PHYSICAL EXAM
[de-identified] : General: AAOx3, NAD HEENT: EOMI, Sclera white CVS: +2 Pulses Pulmonary: No Respiratory Distress Abdomen: Soft, NT, ND MSK: Moving all extremities against gravity Neuro: Sensation I to LT Extremities: (-)Edema Derm: No Rash, c/d/i - dressing intact Psych: Calm, Cooperative

## 2024-05-24 NOTE — DATA REVIEWED
[FreeTextEntry1] :  PROCEDURE: XR LS SPINE W BEND MIN 6 VIEWS   ORDER #: [id number] CC: ; ; ; End of cc's  Radiographs of the lumbar spine  CLINICAL INFORMATION: Injury with Pain.  TECHNIQUE: Lumbar spine frontal, lateral, lateral flexion and extension views, bilateral oblique views.  FINDINGS: No previous examinations are available for review. Advanced degenerative spondylosis. T12-L1, L1-L2 and L2-L3 intervertebral disc space narrowing with endplate anterior and lateral osteophytes. Degenerative narrowing of the L5-S1 disc space.  Lower thoracic upper lumbar levoscoliotic curve. Lower lumbar vertebral heights maintained. No gross fracture or vertebral subluxation.. The sacroiliac joints are intact.  IMPRESSION: Advanced degenerative spondylosis as described. Please see same day lumbar spine CT report. MRI LUMBAR SPINE  ORDER #:  [id number] CC: ; ; ; End of cc's  CLINICAL INFORMATION: Left-sided low back and leg pain, lumbar radiculopathy  ADDITIONAL CLINICAL INFORMATION: Scoliosis M41.9  TECHNIQUE: Multiplanar, multisequence MRI was performed of the lumbar spine. IV Contrast: NONE  PRIOR STUDIES: No priors available for comparison.  FINDINGS:  LOCALIZER: No additional findings. BONES: There is no fracture or bone marrow edema. ALIGNMENT: There is mild dextrocurvature of the lumbar spine and levocurvature of the thoracic spine. There is trace retrolisthesis at L1-L2, L2-L3 and L3-L4. SACROILIAC JOINTS/SACRUM: There is no sacral fracture. The SI joints are partially visualized but are intact. CONUS AND CAUDA EQUINA: The distal cord and conus are normal in signal. Conus terminates at L1. VISUALIZED INTRAPELVIC/INTRA-ABDOMINAL SOFT TISSUES: Normal. PARASPINAL SOFT TISSUES: Normal.   INDIVIDUAL LEVELS:  LOWER THORACIC SPINE: No spinal canal or neuroforaminal stenosis.  L1-L2: Broad-based posterior disc bulge and mild bilateral facet arthropathy result in no significant central canal or neural foraminal narrowing. L2-L3: Broad-based posterior disc bulge and moderate bilateral facet arthropathy result in mild to moderate right neural foraminal narrowing, moderate left neural foraminal narrowing but no significant central canal narrowing. There is right lateral recess stenosis. L3-L4: Broad-based posterior disc bulge and moderate bilateral facet arthropathy result in moderate right neural foraminal narrowing, moderate to severe left neural foraminal narrowing and mild central canal narrowing. There is probable left lateral recess stenosis. L4-L5: Broad-based posterior disc bulge with superimposed small left intraforaminal protrusion in conjunction with moderate bilateral facet arthropathy results in moderate left neural foraminal narrowing, mild right neural foraminal narrowing but no significant central canal narrowing. L5-S1: Broad-based posterior disc osteophyte complex and moderate bilateral facet arthropathy result in mild left neural foraminal narrowing but no significant right neural foraminal or central canal narrowing.   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 Additional varying degrees of central canal and neural foraminal narrowing, as above.

## 2024-06-25 ENCOUNTER — APPOINTMENT (OUTPATIENT)
Dept: PAIN MANAGEMENT | Facility: CLINIC | Age: 79
End: 2024-06-25
Payer: MEDICARE

## 2024-06-25 VITALS
WEIGHT: 135 LBS | HEIGHT: 66 IN | BODY MASS INDEX: 21.69 KG/M2 | OXYGEN SATURATION: 95 % | HEART RATE: 67 BPM | SYSTOLIC BLOOD PRESSURE: 137 MMHG | DIASTOLIC BLOOD PRESSURE: 65 MMHG

## 2024-06-25 DIAGNOSIS — M47.816 SPONDYLOSIS W/OUT MYELOPATHY OR RADICULOPATHY, LUMBAR REGION: ICD-10-CM

## 2024-06-25 PROCEDURE — 99212 OFFICE O/P EST SF 10 MIN: CPT

## 2024-07-30 ENCOUNTER — APPOINTMENT (OUTPATIENT)
Dept: PAIN MANAGEMENT | Facility: CLINIC | Age: 79
End: 2024-07-30
Payer: MEDICARE

## 2024-07-30 VITALS
HEIGHT: 66 IN | SYSTOLIC BLOOD PRESSURE: 120 MMHG | OXYGEN SATURATION: 95 % | BODY MASS INDEX: 21.44 KG/M2 | HEART RATE: 70 BPM | WEIGHT: 133.4 LBS | DIASTOLIC BLOOD PRESSURE: 72 MMHG

## 2024-07-30 DIAGNOSIS — M54.16 RADICULOPATHY, LUMBAR REGION: ICD-10-CM

## 2024-07-30 PROCEDURE — 99214 OFFICE O/P EST MOD 30 MIN: CPT

## 2024-07-30 NOTE — ASSESSMENT
[FreeTextEntry1] : Mr. MEETA HAMM is a 78 year M suffering from low back and left leg pain, that based upon today's subjective complaints, physical examination, and MRI review, is likely multifactorial with ongoing element of facet arthopathy and associated lumbar radiulopathy  >> Medications  Chronic opioid use for non-malignant pain, in particular at high doses would not be recommended since it can potentially lead to hyperalgesia (hypersensitivity), tolerance and addiction.  DC Lyrica  >> Interventions  Arrange for L4 and L5  transforaminal epidural steroid injection under fluoroscopic guidance. Success rate and possible complications discussed with patient in detail.  > > Therapy and Other Modalities  CW PT  >> Imaging and Other Studies  I have personally reviewed the images in detail together with the patient today, and I have answered all questions regarding this condition to the best of my ability, to the patient's satisfaction.  >> Consults  None ordered.    Based upon current evaluation and management, I will be providing ongoing longitudinal care for this complex painful condition, described above. Patient is encouraged to contact me with any questions or concerns.

## 2024-07-30 NOTE — HISTORY OF PRESENT ILLNESS
[Back Pain] : back pain [FreeTextEntry1] : Interval Note:  Since last visit the pain intensity has persisted  Engadging in daily home stretching regimen  Moving bowels regularly. No recent falls.   Patient denies any bowel/bladder incontinence, no saddle/perineal anesthesia or any other red flag signs or symptoms.   ---  On April 25, 2024 patient underwent left L4-L5 peripheral nerve stimulator and has been reporting significant relief thus far, particularly the left sided low back pain   On February 1, 2024 patient underwent left L3-L4-L5 lumbar radiofrequency ablation 80% relief low back pain ---   left L3-L4-L5 lumbar diagnostic medial branch blocks December 21, 2023 with report of 80% relief.   Patient underwent left L3-L4-L5 diagnostic medial branch blocks on November 16, 2023 with 80% relief x 3 days ---  On October 11, 2023 patient underwent left L4 and L5 lumbar transforaminal epidural steroid injection with 80% relief of leg pain, but still has left sided back and hip pain  -- HPI - Mr. MEETA HAMM is a 78 year M with PHx as below, referred by FRIENDS  who presents today with chief complaints of consistent back pain that began 5 months ago. The pain is located in the lower back and referred to the left calf. The current, average, and minimum pain levels are all rated at 5/10, with a maximum intensity reaching 8/10. The pain is described as aching, throbbing, and shooting in nature. Activities that exacerbate the pain include sitting, standing, transitioning, bending, and lifting. Relief measures include laying down, taking medications, and the application of heat or ice. PT. The pain is functionally and emotionally disabling for the patient as its preventing them from going about activities of daily living, such as routine housework. The pain does impair the patients ability to sleep.   Patient had bilateral thoracic radiofrequency T10, T11, T12 June 7, 2023 followed by L3/4 interlaminar epidural steroid injection on August 3, 2023 again by Dr. Son Segal  Patient attests to  6 weeks of provider directed treatment, including a provider directed stretching regimen,  Patient reports treatment that occurred within the last 3 months Patient report that symptoms have been persistent and and progressing after treatment    Reports there is NO present numbness, there is NO weakness. Patient denies any bowel/bladder incontinence, no saddle/perineal anesthesia or any other red flag signs or symptoms. Reports regular BMs.

## 2024-07-30 NOTE — PHYSICAL EXAM
[de-identified] : General: AAOx3, NAD HEENT: EOMI, Sclera white CVS: +2 Pulses Pulmonary: No Respiratory Distress Abdomen: Soft, NT, ND MSK: Moving all extremities against gravity Neuro: Sensation I to LT Extremities: (-)Edema Derm: No Rash PNS Lead site C/D/I Psych: Calm, Cooperative

## 2024-07-30 NOTE — DATA REVIEWED
[FreeTextEntry1] :  PROCEDURE: XR LS SPINE W BEND MIN 6 VIEWS   ORDER #: [id number] CC: ; ; ; End of cc's  Radiographs of the lumbar spine  CLINICAL INFORMATION: Injury with Pain.  TECHNIQUE: Lumbar spine frontal, lateral, lateral flexion and extension views, bilateral oblique views.  FINDINGS: No previous examinations are available for review. Advanced degenerative spondylosis. T12-L1, L1-L2 and L2-L3 intervertebral disc space narrowing with endplate anterior and lateral osteophytes. Degenerative narrowing of the L5-S1 disc space.  Lower thoracic upper lumbar levoscoliotic curve. Lower lumbar vertebral heights maintained. No gross fracture or vertebral subluxation.. The sacroiliac joints are intact.  IMPRESSION: Advanced degenerative spondylosis as described. Please see same day lumbar spine CT report. MRI LUMBAR SPINE  ORDER #:  [id number] CC: ; ; ; End of cc's  CLINICAL INFORMATION: Left-sided low back and leg pain, lumbar radiculopathy  ADDITIONAL CLINICAL INFORMATION: Scoliosis M41.9  TECHNIQUE: Multiplanar, multisequence MRI was performed of the lumbar spine. IV Contrast: NONE  PRIOR STUDIES: No priors available for comparison.  FINDINGS:  LOCALIZER: No additional findings. BONES: There is no fracture or bone marrow edema. ALIGNMENT: There is mild dextrocurvature of the lumbar spine and levocurvature of the thoracic spine. There is trace retrolisthesis at L1-L2, L2-L3 and L3-L4. SACROILIAC JOINTS/SACRUM: There is no sacral fracture. The SI joints are partially visualized but are intact. CONUS AND CAUDA EQUINA: The distal cord and conus are normal in signal. Conus terminates at L1. VISUALIZED INTRAPELVIC/INTRA-ABDOMINAL SOFT TISSUES: Normal. PARASPINAL SOFT TISSUES: Normal.   INDIVIDUAL LEVELS:  LOWER THORACIC SPINE: No spinal canal or neuroforaminal stenosis.  L1-L2: Broad-based posterior disc bulge and mild bilateral facet arthropathy result in no significant central canal or neural foraminal narrowing. L2-L3: Broad-based posterior disc bulge and moderate bilateral facet arthropathy result in mild to moderate right neural foraminal narrowing, moderate left neural foraminal narrowing but no significant central canal narrowing. There is right lateral recess stenosis. L3-L4: Broad-based posterior disc bulge and moderate bilateral facet arthropathy result in moderate right neural foraminal narrowing, moderate to severe left neural foraminal narrowing and mild central canal narrowing. There is probable left lateral recess stenosis. L4-L5: Broad-based posterior disc bulge with superimposed small left intraforaminal protrusion in conjunction with moderate bilateral facet arthropathy results in moderate left neural foraminal narrowing, mild right neural foraminal narrowing but no significant central canal narrowing. L5-S1: Broad-based posterior disc osteophyte complex and moderate bilateral facet arthropathy result in mild left neural foraminal narrowing but no significant right neural foraminal or central canal narrowing.   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 Additional varying degrees of central canal and neural foraminal narrowing, as above.

## 2024-08-28 ENCOUNTER — APPOINTMENT (OUTPATIENT)
Dept: PAIN MANAGEMENT | Facility: HOSPITAL | Age: 79
End: 2024-08-28

## 2024-08-28 ENCOUNTER — TRANSCRIPTION ENCOUNTER (OUTPATIENT)
Age: 79
End: 2024-08-28

## 2024-09-09 ENCOUNTER — APPOINTMENT (OUTPATIENT)
Dept: PAIN MANAGEMENT | Facility: CLINIC | Age: 79
End: 2024-09-09
Payer: MEDICARE

## 2024-09-09 VITALS
SYSTOLIC BLOOD PRESSURE: 114 MMHG | HEART RATE: 100 BPM | BODY MASS INDEX: 21.38 KG/M2 | DIASTOLIC BLOOD PRESSURE: 78 MMHG | HEIGHT: 66 IN | OXYGEN SATURATION: 91 % | WEIGHT: 133 LBS

## 2024-09-09 DIAGNOSIS — M54.16 RADICULOPATHY, LUMBAR REGION: ICD-10-CM

## 2024-09-09 PROCEDURE — 99213 OFFICE O/P EST LOW 20 MIN: CPT

## 2024-09-09 NOTE — DATA REVIEWED
[FreeTextEntry1] :  PROCEDURE: XR LS SPINE W BEND MIN 6 VIEWS   ORDER #: [id number] CC: ; ; ; End of cc's  Radiographs of the lumbar spine  CLINICAL INFORMATION: Injury with Pain.  TECHNIQUE: Lumbar spine frontal, lateral, lateral flexion and extension views, bilateral oblique views.  FINDINGS: No previous examinations are available for review. Advanced degenerative spondylosis. T12-L1, L1-L2 and L2-L3 intervertebral disc space narrowing with endplate anterior and lateral osteophytes. Degenerative narrowing of the L5-S1 disc space.  Lower thoracic upper lumbar levoscoliotic curve. Lower lumbar vertebral heights maintained. No gross fracture or vertebral subluxation.. The sacroiliac joints are intact.  IMPRESSION: Advanced degenerative spondylosis as described. Please see same day lumbar spine CT report. MRI LUMBAR SPINE  ORDER #:  [id number] CC: ; ; ; End of cc's  CLINICAL INFORMATION: Left-sided low back and leg pain, lumbar radiculopathy  ADDITIONAL CLINICAL INFORMATION: Scoliosis M41.9  TECHNIQUE: Multiplanar, multisequence MRI was performed of the lumbar spine. IV Contrast: NONE  PRIOR STUDIES: No priors available for comparison.  FINDINGS:  LOCALIZER: No additional findings. BONES: There is no fracture or bone marrow edema. ALIGNMENT: There is mild dextrocurvature of the lumbar spine and levocurvature of the thoracic spine. There is trace retrolisthesis at L1-L2, L2-L3 and L3-L4. SACROILIAC JOINTS/SACRUM: There is no sacral fracture. The SI joints are partially visualized but are intact. CONUS AND CAUDA EQUINA: The distal cord and conus are normal in signal. Conus terminates at L1. VISUALIZED INTRAPELVIC/INTRA-ABDOMINAL SOFT TISSUES: Normal. PARASPINAL SOFT TISSUES: Normal.   INDIVIDUAL LEVELS:  LOWER THORACIC SPINE: No spinal canal or neuroforaminal stenosis.  L1-L2: Broad-based posterior disc bulge and mild bilateral facet arthropathy result in no significant central canal or neural foraminal narrowing. L2-L3: Broad-based posterior disc bulge and moderate bilateral facet arthropathy result in mild to moderate right neural foraminal narrowing, moderate left neural foraminal narrowing but no significant central canal narrowing. There is right lateral recess stenosis. L3-L4: Broad-based posterior disc bulge and moderate bilateral facet arthropathy result in moderate right neural foraminal narrowing, moderate to severe left neural foraminal narrowing and mild central canal narrowing. There is probable left lateral recess stenosis. L4-L5: Broad-based posterior disc bulge with superimposed small left intraforaminal protrusion in conjunction with moderate bilateral facet arthropathy results in moderate left neural foraminal narrowing, mild right neural foraminal narrowing but no significant central canal narrowing. L5-S1: Broad-based posterior disc osteophyte complex and moderate bilateral facet arthropathy result in mild left neural foraminal narrowing but no significant right neural foraminal or central canal narrowing.   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 Additional varying degrees of central canal and neural foraminal narrowing, as above.

## 2024-09-09 NOTE — ASSESSMENT
[FreeTextEntry1] : Mr. MEETA HAMM is a 78 year M suffering from low back and left leg pain, that based upon today's subjective complaints, physical examination, and MRI review, is likely multifactorial with ongoing element of facet arthopathy and associated lumbar radiulopathy  >> Medications  Chronic opioid use for non-malignant pain, in particular at high doses would not be recommended since it can potentially lead to hyperalgesia (hypersensitivity), tolerance and addiction.  DC Lyrica  >> Interventions  > > Therapy and Other Modalities  CW PT  >> Imaging and Other Studies  I have personally reviewed the images in detail together with the patient today, and I have answered all questions regarding this condition to the best of my ability, to the patient's satisfaction.  >> Consults  None ordered.    Based upon current evaluation and management, I will be providing ongoing longitudinal care for this complex painful condition, described above. Patient is encouraged to contact me with any questions or concerns.

## 2024-09-09 NOTE — HISTORY OF PRESENT ILLNESS
[Back Pain] : back pain [FreeTextEntry1] : Interval Note:  8/28/24 - Left L4 and L5 Transformaminal Epidural Steroid with 80% relief of the lower leg pain however the upper leg pain is progressing  Going to be beginning physical therapy tomorrow  Engaging in daily home stretching regimen  Moving bowels regularly. No recent falls.   Patient denies any bowel/bladder incontinence, no saddle/perineal anesthesia or any other red flag signs or symptoms.   ---  On April 25, 2024 patient underwent left L4-L5 peripheral nerve stimulator and has been reporting significant relief thus far, particularly the left sided low back pain  On February 1, 2024 patient underwent left L3-L4-L5 lumbar radiofrequency ablation 80% relief low back pain ---   left L3-L4-L5 lumbar diagnostic medial branch blocks December 21, 2023 with report of 80% relief.   Patient underwent left L3-L4-L5 diagnostic medial branch blocks on November 16, 2023 with 80% relief x 3 days ---  On October 11, 2023 patient underwent left L4 and L5 lumbar transforaminal epidural steroid injection with 80% relief of leg pain, but still has left sided back and hip pain  -- HPI - Mr. MEETA HAMM is a 78 year M with PHx as below, referred by FRIENDS  who presents today with chief complaints of consistent back pain that began 5 months ago. The pain is located in the lower back and referred to the left calf. The current, average, and minimum pain levels are all rated at 5/10, with a maximum intensity reaching 8/10. The pain is described as aching, throbbing, and shooting in nature. Activities that exacerbate the pain include sitting, standing, transitioning, bending, and lifting. Relief measures include laying down, taking medications, and the application of heat or ice. PT. The pain is functionally and emotionally disabling for the patient as its preventing them from going about activities of daily living, such as routine housework. The pain does impair the patients ability to sleep.   Patient had bilateral thoracic radiofrequency T10, T11, T12 June 7, 2023 followed by L3/4 interlaminar epidural steroid injection on August 3, 2023 again by Dr. Son Segal  Patient attests to  6 weeks of provider directed treatment, including a provider directed stretching regimen,  Patient reports treatment that occurred within the last 3 months Patient report that symptoms have been persistent and and progressing after treatment    Reports there is NO present numbness, there is NO weakness. Patient denies any bowel/bladder incontinence, no saddle/perineal anesthesia or any other red flag signs or symptoms. Reports regular BMs.

## 2024-09-09 NOTE — PHYSICAL EXAM
[de-identified] : General: AAOx3, NAD HEENT: EOMI, Sclera white CVS: +2 Pulses Pulmonary: No Respiratory Distress Abdomen: Soft, NT, ND MSK: Moving all extremities against gravity Neuro: Sensation I to LT Extremities: (-)Edema Derm: No Rash Psych: Calm, Cooperative

## 2024-10-07 ENCOUNTER — APPOINTMENT (OUTPATIENT)
Dept: PAIN MANAGEMENT | Facility: CLINIC | Age: 79
End: 2024-10-07
Payer: MEDICARE

## 2024-10-07 VITALS
HEART RATE: 73 BPM | SYSTOLIC BLOOD PRESSURE: 122 MMHG | OXYGEN SATURATION: 95 % | DIASTOLIC BLOOD PRESSURE: 66 MMHG | BODY MASS INDEX: 21.38 KG/M2 | HEIGHT: 66 IN | WEIGHT: 133 LBS

## 2024-10-07 DIAGNOSIS — M54.16 RADICULOPATHY, LUMBAR REGION: ICD-10-CM

## 2024-10-07 PROCEDURE — 99214 OFFICE O/P EST MOD 30 MIN: CPT

## 2024-10-07 PROCEDURE — G2211 COMPLEX E/M VISIT ADD ON: CPT
